# Patient Record
Sex: FEMALE | Race: BLACK OR AFRICAN AMERICAN | Employment: FULL TIME | URBAN - METROPOLITAN AREA
[De-identification: names, ages, dates, MRNs, and addresses within clinical notes are randomized per-mention and may not be internally consistent; named-entity substitution may affect disease eponyms.]

---

## 2017-01-18 ENCOUNTER — HOSPITAL ENCOUNTER (OUTPATIENT)
Dept: MAMMOGRAPHY | Age: 57
Discharge: HOME | End: 2017-01-18
Attending: OBSTETRICS & GYNECOLOGY
Payer: COMMERCIAL

## 2017-01-18 DIAGNOSIS — Z01.419 ENCOUNTER FOR GYNECOLOGICAL EXAMINATION WITHOUT ABNORMAL FINDING: ICD-10-CM

## 2017-01-18 PROCEDURE — 77067 SCR MAMMO BI INCL CAD: CPT

## 2017-03-13 ENCOUNTER — HOSPITAL ENCOUNTER (OUTPATIENT)
Dept: MAMMOGRAPHY | Age: 57
Discharge: HOME | End: 2017-03-13
Attending: INTERNAL MEDICINE
Payer: COMMERCIAL

## 2017-03-13 DIAGNOSIS — R92.8 ABNORMAL MAMMOGRAM OF LEFT BREAST: ICD-10-CM

## 2017-03-13 PROCEDURE — 77065 DX MAMMO INCL CAD UNI: CPT

## 2017-12-13 ENCOUNTER — OFFICE VISIT (OUTPATIENT)
Dept: SURGERY | Age: 57
End: 2017-12-13

## 2017-12-13 VITALS
WEIGHT: 185.2 LBS | HEART RATE: 84 BPM | SYSTOLIC BLOOD PRESSURE: 151 MMHG | HEIGHT: 61 IN | DIASTOLIC BLOOD PRESSURE: 81 MMHG | BODY MASS INDEX: 34.97 KG/M2 | TEMPERATURE: 97.2 F | OXYGEN SATURATION: 97 %

## 2017-12-13 DIAGNOSIS — Z90.710 S/P TAH (TOTAL ABDOMINAL HYSTERECTOMY): ICD-10-CM

## 2017-12-13 DIAGNOSIS — R23.2 HOT FLASHES: ICD-10-CM

## 2017-12-13 DIAGNOSIS — Z90.721 HISTORY OF LEFT SALPINGO-OOPHORECTOMY: ICD-10-CM

## 2017-12-13 DIAGNOSIS — Z01.419 WOMEN'S ANNUAL ROUTINE GYNECOLOGICAL EXAMINATION: Primary | ICD-10-CM

## 2017-12-13 DIAGNOSIS — Z90.79 HISTORY OF LEFT SALPINGO-OOPHORECTOMY: ICD-10-CM

## 2017-12-13 NOTE — MR AVS SNAPSHOT
Visit Information Date & Time Provider Department Dept. Phone Encounter #  
 12/13/2017 10:45 AM Fifi Abraham, [address] [facility] [address] [id number] Follow-up Instructions Return in about 1 year (around 12/13/2018), or if symptoms worsen or fail to improve. Upcoming Health Maintenance Date Due Hepatitis C Screening 1960 HEMOGLOBIN A1C Q6M 1960 LIPID PANEL Q1 1960 FOOT EXAM Q1 7/29/1970 MICROALBUMIN Q1 7/29/1970 EYE EXAM RETINAL OR DILATED Q1 7/29/1970 Pneumococcal 19-64 Medium Risk (1 of 1 - PPSV23) 7/29/1979 DTaP/Tdap/Td series (1 - Tdap) 7/29/1981 FOBT Q 1 YEAR AGE 50-75 7/29/2010 Influenza Age 5 to Adult 8/1/2017 PAP AKA CERVICAL CYTOLOGY 11/25/2018 Allergies as of 12/13/2017  Review Complete On: 12/13/2017 By: Fifi Abraham MD  
 No Known Allergies Current Immunizations  Never Reviewed No immunizations on file. Not reviewed this visit You Were Diagnosed With   
  
 Codes Comments Women's annual routine gynecological examination    -  Primary ICD-10-CM: F16.923 ICD-9-CM: V72.31 S/P MIRIAM (total abdominal hysterectomy)     ICD-10-CM: Z90.710 ICD-9-CM: V88.01 History of left salpingo-oophorectomy     ICD-10-CM: Z90.721, Z90.79 ICD-9-CM: V45.89 Hot flashes     ICD-10-CM: R23.2 ICD-9-CM: 782.62 Vitals BP Pulse Temp Height(growth percentile) Weight(growth percentile) LMP  
 151/81 84 97.2 °F (36.2 °C) (Oral) 5' 1\" (1.549 m) 185 lb 3.2 oz (84 kg) 08/01/2007 SpO2 BMI OB Status Smoking Status 97% 34.99 kg/m2 Hysterectomy Never Smoker Vitals History BMI and BSA Data Body Mass Index Body Surface Area 34.99 kg/m 2 1.9 m 2 Preferred Pharmacy Pharmacy Name Phone [address] [phone] Your Updated Medication List  
  
   
 This list is accurate as of: 12/13/17 11:24 AM.  Always use your most recent med list. amLODIPine 5 mg tablet Commonly known as:  Fernandez Presser Take 5 mg by mouth daily. atorvastatin 20 mg tablet Commonly known as:  LIPITOR Take 10 mg by mouth daily. FARXIGA 10 mg Tab tablet Generic drug:  dapagliflozin Take  by mouth.  
  
 glimepiride 4 mg tablet Commonly known as:  AMARYL Take 4 mg by mouth two (2) times a day. JANUMET 50-1,000 mg per tablet Generic drug:  SITagliptin-metFORMIN Take 1 Tab by mouth two (2) times daily (with meals). lisinopril 10 mg tablet Commonly known as:  Larry Pares Take  by mouth daily. TOUJEO SOLOSTAR SC  
by SubCUTAneous route. VITAMIN D2 50,000 unit capsule Generic drug:  ergocalciferol Take 50,000 Units by mouth two (2) times a week. Follow-up Instructions Return in about 1 year (around 12/13/2018), or if symptoms worsen or fail to improve. To-Do List   
 01/18/2018 Imaging:  DANIELA MAMMO BI SCREENING INCL CAD Introducing [facility]! Dario Bonilla introduces [email or web address] patient portal. Now you can access parts of your medical record, email your doctor's office, and request medication refills online. 1. In your internet browser, go to [email or web address] 2. Click on the First Time User? Click Here link in the Sign In box. You will see the New Member Sign Up page. 3. Enter your [email or web address] Access Code exactly as it appears below. You will not need to use this code after youve completed the sign-up process. If you do not sign up before the expiration date, you must request a new code. · [email or web address] Access Code: [email or web address] Expires: 3/13/2018 11:00 AM 
 
4. Enter the last four digits of your Social Security Number (xxxx) and Date of Birth (mm/dd/yyyy) as indicated and click Submit. You will be taken to the next sign-up page. 5. Create a OP3Nvoice ID. This will be your OP3Nvoice login ID and cannot be changed, so think of one that is secure and easy to remember. 6. Create a OP3Nvoice password. You can change your password at any time. 7. Enter your Password Reset Question and Answer. This can be used at a later time if you forget your password. 8. Enter your e-mail address. You will receive e-mail notification when new information is available in [address] 9. Click Sign Up. You can now view and download portions of your medical record. 10. Click the Download Summary menu link to download a portable copy of your medical information. If you have questions, please visit the Frequently Asked Questions section of the OP3Nvoice website. Remember, OP3Nvoice is NOT to be used for urgent needs. For medical emergencies, dial 911. Now available from your iPhone and Android! Please provide this summary of care documentation to your next provider. Your primary care clinician is listed as Trice Mckinney. If you have any questions after today's visit, please call [phone].

## 2017-12-13 NOTE — PROGRESS NOTES
SUBJECTIVE: Helder Montgomery is a 62 y.o. female [id number] (Abortions 1, Miscarriages 0, Ectopic 1), who presents with desire for annual well woman exam. Patient's last menstrual period was 2007. Severe hot flashes and no desire to restart ERT. Reports vaginal odor off and on when hot flashes are stronger and she perspires more. Using OTC supps with good results. No Known Allergies     Past Medical History:   Diagnosis Date    Diabetes ([address]     on insulin and 2 different pills. 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     Past Surgical History:   Procedure Laterality Date    HX HYSTERECTOMY  2007    MIRIAM LSO    HX PELVIC LAPAROSCOPY  3/20/08    for pelvic pain and findings were negative     OB History      Para Term  AB Living    4 2   2     SAB TAB Ectopic Molar Multiple Live Births      1           Family History   Problem Relation Age of Onset    Cancer Father      throat cancer    Diabetes Father     Diabetes Sister      diabetic coma     Social History     Social History    Marital status: SINGLE     Spouse name: N/A    Number of children: N/A    Years of education: N/A     Occupational History    Not on file. Social History Main Topics    Smoking status: Never Smoker    Smokeless tobacco: Never Used    Alcohol use 0.0 oz/week     0 Standard drinks or equivalent per week      Comment: occasionally    Drug use: No    Sexual activity: Yes     Partners: Male     Birth control/ protection: Surgical     Other Topics Concern    Not on file     Social History Narrative     Current Outpatient Prescriptions   Medication Sig Dispense Refill    sitaGLIPtin-metFORMIN (JANUMET) 50-1,000 mg per tablet Take 1 Tab by mouth two (2) times daily (with meals).  dapagliflozin (FARXIGA) 10 mg tab Take  by mouth.       amLODIPine (NORVASC) 5 mg tablet Take 5 mg by mouth daily.  glimepiride (AMARYL) 4 mg tablet Take 4 mg by mouth two (2) times a day.  lisinopril (PRINIVIL, ZESTRIL) 10 mg tablet Take  by mouth daily.  atorvastatin (LIPITOR) 20 mg tablet Take 10 mg by mouth daily.  ergocalciferol (VITAMIN D2) 50,000 unit capsule Take 50,000 Units by mouth two (2) times a week.  INSULIN GLARGINE,HUM. REC. ANLOG (TOUJEO SOLOSTAR SC) by SubCUTAneous route. Review of Systems:   Constitutional: No weight change, chills or fever, anorexia, weakness or sleep disturbance . Cardiovascular: No chest pain, shortness of breath, or palpitations . Respiratory: No cough, shortness of breath, hemoptysis, or orthopnea . Neurologic: No syncope, headaches or seizures . Hematologic: No easy bruising or unusual bleeding . Psychiatric: No insomnia, confusion, depression, or anxiety . GI:No nausea and vomiting, diarrhea or constipation  . : See HPI . Musculoskeletal: No joint pain or muscle pain . Endocrine: No polydipsia, polyuria, cold intolerance, excessive fatigue, or sleep disturbance . Integumentary: No breast pain, lumps, nipple discharge, or axillary lumps . Objective:     Visit Vitals    /81    Pulse 84    Temp 97.2 °F (36.2 °C) (Oral)    Ht 5' 1\" (1.549 m)    Wt 185 lb 3.2 oz (84 kg)    LMP 08/01/2007    SpO2 97%    BMI 34.99 kg/m2       General:  alert, cooperative, no distress, appears stated age   Skin:  no rash or abnormalities   Eyes: negative   Mouth: MMM no lesions   Lymph Nodes:  Cervical, supraclavicular, and axillary nodes normal.   Breast Exam: normal appearance, no masses or tenderness    Lungs:  clear to auscultation bilaterally   Heart:  regular rate and rhythm   Abdomen: soft, non-tender.  Bowel sounds normal. No masses,  no organomegaly   Back:  Costovertebral angle tenderness absent   Genitourinary: Pelvic exam: VULVA: normal appearing vulva with no masses, tenderness or lesions, VAGINA: normal appearing vagina with normal color and discharge, no lesions, CERVIX: surgically absent, UTERUS: surgically absent, vaginal cuff well healed, ADNEXA: surgically absent left    Extremities:  extremities normal, atraumatic, no cyanosis or edema   Neurologic:  sensation grossly intact. Psychiatric:  non focal     ASSESSMENT:      ICD-10-CM ICD-9-CM    1. Women's annual routine gynecological examination Z01.419 V72.31 DANIELA MAMMO BI SCREENING INCL CAD   2. S/P MIRIAM (total abdominal hysterectomy) Z90.710 V88.01    3. History of left salpingo-oophorectomy Z90.721 V45.89     Z90.79     4. Severe hot flashes R23.2 782.62         Follow-up Disposition:  Return in about 1 year (around 12/13/2018), or if symptoms worsen or fail to improve.

## 2018-08-29 ENCOUNTER — OP HISTORICAL/CONVERTED ENCOUNTER (OUTPATIENT)
Dept: OTHER | Age: 58
End: 2018-08-29

## 2018-12-19 ENCOUNTER — OFFICE VISIT (OUTPATIENT)
Dept: SURGERY | Age: 58
End: 2018-12-19

## 2018-12-19 VITALS
TEMPERATURE: 97.5 F | BODY MASS INDEX: 34.48 KG/M2 | DIASTOLIC BLOOD PRESSURE: 74 MMHG | RESPIRATION RATE: 18 BRPM | HEART RATE: 89 BPM | OXYGEN SATURATION: 100 % | WEIGHT: 182.6 LBS | SYSTOLIC BLOOD PRESSURE: 147 MMHG | HEIGHT: 61 IN

## 2018-12-19 DIAGNOSIS — N32.81 OVERACTIVE BLADDER: ICD-10-CM

## 2018-12-19 DIAGNOSIS — Z01.419 ENCOUNTER FOR GYNECOLOGICAL EXAMINATION WITHOUT ABNORMAL FINDING: Primary | ICD-10-CM

## 2018-12-19 DIAGNOSIS — Z12.31 VISIT FOR SCREENING MAMMOGRAM: ICD-10-CM

## 2018-12-19 NOTE — PROGRESS NOTES
Chief Complaint   Patient presents with    Well Woman     Pt presents in office for annual exam with no GYN c/o. Last pap 2015. Last mammogram 2016. PHQ over the last two weeks 12/19/2018   Little interest or pleasure in doing things Not at all   Feeling down, depressed, irritable, or hopeless Not at all   Total Score PHQ 2 0     1. Have you been to the ER, urgent care clinic since your last visit? Hospitalized since your last visit? No    2. Have you seen or consulted any other health care providers outside of the [facility], [place] since your last visit? Include any pap smears or colon screening.  No

## 2018-12-19 NOTE — PATIENT INSTRUCTIONS
Bladder Training: Care Instructions  Your Care Instructions    Bladder training is used to treat urge incontinence and stress incontinence. Urge incontinence means that the need to urinate comes on so fast that you can't get to a toilet in time. Stress incontinence means that you leak urine because of pressure on your bladder. For example, it may happen when you laugh, cough, or lift something heavy. Bladder training can increase how long you can wait before you have to urinate. It can also help your bladder hold more urine. And it can give you better control over the urge to urinate. It is important to remember that bladder training takes a few weeks to a few months to make a difference. You may not see results right away, but don't give up. Follow-up care is a key part of your treatment and safety. Be sure to make and go to all appointments, and call your doctor if you are having problems. It's also a good idea to know your test results and keep a list of the medicines you take. How can you care for yourself at home? Work with your doctor to come up with a bladder training program that is right for you. You may use one or more of the following methods. Delayed urination  · In the beginning, try to keep from urinating for 5 minutes after you first feel the need to go. · While you wait, take deep, slow breaths to relax. Kegel exercises can also help you delay the need to go to the bathroom. · After some practice, when you can easily wait 5 minutes to urinate, try to wait 10 minutes before you urinate. · Slowly increase the waiting period until you are able to control when you have to urinate. Scheduled urination  · Empty your bladder when you first wake up in the morning. · Schedule times throughout the day when you will urinate. · Start by going to the bathroom every hour, even if you don't need to go. · Slowly increase the time between trips to the bathroom.   · When you have found a schedule that works well for you, keep doing it. · If you wake up during the night and have to urinate, do it. Apply your schedule to waking hours only. Kegel exercises  These tighten and strengthen pelvic muscles, which can help you control the flow of urine. To do Kegel exercises:  · Squeeze the same muscles you would use to stop your urine. Your belly and thighs should not move. · Hold the squeeze for 3 seconds, and then relax for 3 seconds. · Start with 3 seconds. Then add 1 second each week until you are able to squeeze for 10 seconds. · Repeat the exercise 10 to 15 times a session. Do three or more sessions a day. When should you call for help? Watch closely for changes in your health, and be sure to contact your doctor if:    · Your incontinence is getting worse.     · You do not get better as expected. Where can you learn more? Go to http://cece-nitesh.info/. Enter F639 in the search box to learn more about \"Bladder Training: Care Instructions. \"  Current as of: March 21, 2018  Content Version: 11.8  © 5836-3039 [employer]. Care instructions adapted under license by [employer] (which disclaims liability or warranty for this information). If you have questions about a medical condition or this instruction, always ask your healthcare professional. [address] any warranty or liability for your use of this information. Kegel Exercises: Care Instructions  Your Care Instructions    Kegel exercises strengthen muscles around the bladder. These muscles control the flow of urine. Kegel exercises are sometime called \"pelvic floor\" exercises. They can help prevent urine leakage and keep the pelvic organs in place. A woman who just had a baby might want to try Kegel exercises. They can strengthen pelvic muscles that have been weakened by pregnancy and childbirth. A man or woman may use Kegel exercises to treat urine leakage.   You do Kegel exercises by tightening the muscles you use when you urinate. You will likely need to do these exercises for several weeks to get better. Follow-up care is a key part of your treatment and safety. Be sure to make and go to all appointments, and call your doctor if you are having problems. It's also a good idea to know your test results and keep a list of the medicines you take. How can you care for yourself at home? · Do Kegel exercises. ? Find the muscles you need to strengthen. To do this, tighten the muscles that stop your urine while you are going to the bathroom. These are the same muscles you squeeze during Kegel exercises. ? Squeeze the muscles as hard as you can. Your belly and thighs should not move. ? Hold the squeeze for 3 seconds. Then relax for 3 seconds. ? Start with 3 seconds, and then add 1 second each week until you are able to squeeze for 10 seconds. ? Repeat the exercise 10 to 15 times for each session. Do three or more sessions each day. · You can check to see if you are using the right muscles. Place a finger in your vagina and squeeze around it. You are doing them right when you feel pressure around your finger. Your doctor may also suggest that you put special weights in your vagina while you do the exercises. · Do not smoke. It can irritate the bladder. If you need help quitting, talk to your doctor about stop-smoking programs and medicines. These can increase your chances of quitting for good. Where can you learn more? Go to http://cece-nitesh.info/. Enter W371 in the search box to learn more about \"Kegel Exercises: Care Instructions. \"  Current as of: March 21, 2018  Content Version: 11.8  © 0305-2009 [employer]. Care instructions adapted under license by [employer] (which disclaims liability or warranty for this information).  If you have questions about a medical condition or this instruction, always ask your healthcare professional. [address] any warranty or liability for your use of this information. Well Visit, Women 48 to 72: Care Instructions  Your Care Instructions    Physical exams can help you stay healthy. Your doctor has checked your overall health and may have suggested ways to take good care of yourself. He or she also may have recommended tests. At home, you can help prevent illness with healthy eating, regular exercise, and other steps. Follow-up care is a key part of your treatment and safety. Be sure to make and go to all appointments, and call your doctor if you are having problems. It's also a good idea to know your test results and keep a list of the medicines you take. How can you care for yourself at home? · Reach and stay at a healthy weight. This will lower your risk for many problems, such as obesity, diabetes, heart disease, and high blood pressure. · Get at least 30 minutes of exercise on most days of the week. Walking is a good choice. You also may want to do other activities, such as running, swimming, cycling, or playing tennis or team sports. · Do not smoke. Smoking can make health problems worse. If you need help quitting, talk to your doctor about stop-smoking programs and medicines. These can increase your chances of quitting for good. · Protect your skin from too much sun. When you're outdoors from 10 a.m. to 4 p.m., stay in the shade or cover up with clothing and a hat with a wide brim. Wear sunglasses that block UV rays. Even when it's cloudy, put broad-spectrum sunscreen (SPF 30 or higher) on any exposed skin. · See a dentist one or two times a year for checkups and to have your teeth cleaned. · Wear a seat belt in the car. · Limit alcohol to 1 drink a day. Too much alcohol can cause health problems. Follow your doctor's advice about when to have certain tests. These tests can spot problems early. · Cholesterol.  Your doctor will tell you how often to have this done based on your age, family history, or other things that can increase your risk for heart attack and stroke. · Blood pressure. Have your blood pressure checked during a routine doctor visit. Your doctor will tell you how often to check your blood pressure based on your age, your blood pressure results, and other factors. · Mammogram. Ask your doctor how often you should have a mammogram, which is an X-ray of your breasts. A mammogram can spot breast cancer before it can be felt and when it is easiest to treat. · Pap test and pelvic exam. Ask your doctor how often you should have a Pap test. You may not need to have a Pap test as often as you used to. · Vision. Have your eyes checked every year or two or as often as your doctor suggests. Some experts recommend that you have yearly exams for glaucoma and other age-related eye problems starting at age 48. · Hearing. Tell your doctor if you notice any change in your hearing. You can have tests to find out how well you hear. · Diabetes. Ask your doctor whether you should have tests for diabetes. · Colon cancer. You should begin tests for colon cancer at age 48. You may have one of several tests. Your doctor will tell you how often to have tests based on your age and risk. Risks include whether you already had a precancerous polyp removed from your colon or whether your parents, sisters and brothers, or children have had colon cancer. · Thyroid disease. Talk to your doctor about whether to have your thyroid checked as part of a regular physical exam. Women have an increased chance of a thyroid problem. · Osteoporosis. You should begin tests for bone density at age 72. If you are younger than 72, ask your doctor whether you have factors that may increase your risk for this disease. You may want to have this test before age 72. · Heart attack and stroke risk. At least every 4 to 6 years, you should have your risk for heart attack and stroke assessed. Your doctor uses factors such as your age, blood pressure, cholesterol, and whether you smoke or have diabetes to show what your risk for a heart attack or stroke is over the next 10 years. When should you call for help? Watch closely for changes in your health, and be sure to contact your doctor if you have any problems or symptoms that concern you. Where can you learn more? Go to http://cece-nitesh.info/. Enter A709 in the search box to learn more about \"Well Visit, Women 50 to 72: Care Instructions. \"  Current as of: March 29, 2018  Content Version: 11.8  © 3891-7431 [employer], [employer]. Care instructions adapted under license by [employer] (which disclaims liability or warranty for this information). If you have questions about a medical condition or this instruction, always ask your healthcare professional. [address] any warranty or liability for your use of this information.

## 2018-12-19 NOTE — PROGRESS NOTES
Annual exam ages 40-58 post hysterectomy    Jennifer Ryan is a ,  62 y.o. female [address] Patient's last menstrual period was 2007. .    She presents for her annual checkup. She is having no significant problems. Patient does have some leakage. Patient with some urgency symptoms. No leakage with coughing or sneezing. Does not happen all the time. With regard to the Gardasil vaccine, she is older than the age for which it is FDA approved. Hormonal status:  S/p MIRIAM/BSO for pelvic pain   She is not having vasomotor symptoms. Has occasional hot flashes that are not bothersome. The patient is not using any ERT. Sexual history:    She  reports that she currently engages in sexual activity and has had partners who are Male. She reports using the following method of birth control/protection: Surgical.    Medical conditions:    Since her last annual GYN exam about one year ago, she has not the following changes in her health history: none. Surgical history confirmed with patient. has a past surgical history that includes hx hysterectomy (2007) and hx pelvic laparoscopy (3/20/08). Pap and Mammogram History:    S/p hysterectomy. No hx of abnormal paps    The patient with mammogram last year. Breast Cancer History/Substance Abuse: negative    Osteoporosis History:    Family history does not include a first or second degree relative with osteopenia or osteoporosis. She is currently taking calcium and vit D. Past Medical History:   Diagnosis Date    Diabetes ([address]     on insulin and 2 different pills.     Hypercholesterolemia     on meds    Hypertension     on meds     Ill-defined disease     lumbosacral pain d/t degenerative change in lumbar spine     Past Surgical History:   Procedure Laterality Date    HX HYSTERECTOMY  2007    MIRIAM LSO    HX PELVIC LAPAROSCOPY  3/20/08    for pelvic pain and findings were negative       Current Outpatient Medications Medication Sig Dispense Refill    dapagliflozin (FARXIGA) 10 mg tab Take  by mouth.  glimepiride (AMARYL) 4 mg tablet Take 4 mg by mouth two (2) times a day.  lisinopril (PRINIVIL, ZESTRIL) 10 mg tablet Take  by mouth daily.  atorvastatin (LIPITOR) 20 mg tablet Take 10 mg by mouth daily.  ergocalciferol (VITAMIN D2) 50,000 unit capsule Take 50,000 Units by mouth two (2) times a week.  INSULIN GLARGINE,HUM. REC. ANLOG (TOUJEO SOLOSTAR SC) by SubCUTAneous route.  sitaGLIPtin-metFORMIN (JANUMET) 50-1,000 mg per tablet Take 1 Tab by mouth two (2) times daily (with meals).  amLODIPine (NORVASC) 5 mg tablet Take 5 mg by mouth daily. Allergies: Patient has no known allergies. Tobacco History:  reports that  has never smoked. she has never used smokeless tobacco.  Alcohol Abuse:  reports that she drinks alcohol. Occasional EtOH. Drug Abuse:  reports that she does not use drugs. Patient feels safe at home.     Family Medical/Cancer History:   Family History   Problem Relation Age of Onset    Cancer Father         throat cancer    Diabetes Father     Diabetes Sister         diabetic coma        Review of Systems - History obtained from the patient  Constitutional: negative for weight loss, fever, night sweats  HEENT: negative for hearing loss, earache, congestion, snoring, sorethroat  CV: negative for chest pain, palpitations, edema  Resp: negative for cough, shortness of breath, wheezing  GI: negative for change in bowel habits, abdominal pain, black or bloody stools  : negative for frequency, dysuria, hematuria, vaginal discharge  MSK: negative for back pain, joint pain, muscle pain  Breast: negative for breast lumps, nipple discharge, galactorrhea  Skin :negative for itching, rash, hives  Neuro: negative for dizziness, headache, confusion, weakness  Psych: negative for anxiety, depression, change in mood  Heme/lymph: negative for bleeding, bruising, pallor    Physical Exam    Visit Vitals  /74 (BP 1 Location: Right arm, BP Patient Position: Sitting)   Pulse 89   Temp 97.5 °F (36.4 °C) (Oral)   Resp 18   Ht 5' 1\" (1.549 m)   Wt 182 lb 9.6 oz (82.8 kg)   LMP 08/01/2007   SpO2 100%   BMI 34.50 kg/m²     Constitutional  · Appearance: well-nourished, well developed, alert, in no acute distress    HENT  · Head and Face: appears normal    Neck  · Inspection/Palpation: normal appearance, no masses or tenderness  · Lymph Nodes: no lymphadenopathy present  · Thyroid: gland size normal, nontender, no nodules or masses present on palpation    Chest  · Respiratory Effort: breathing unlabored  · Auscultation: normal breath sounds    Cardiovascular  · Heart:  · Auscultation: regular rate and rhythm without murmur    Breasts  · Inspection of Breasts: breasts symmetrical, no skin changes, no discharge present, nipple appearance normal, no skin retraction present  · Palpation of Breasts and Axillae: no masses present on palpation, no breast tenderness  · Axillary Lymph Nodes: no lymphadenopathy present    Gastrointestinal  · Abdominal Examination: abdomen non-tender to palpation, normal bowel sounds, no masses present  · Liver and spleen: no hepatomegaly present, spleen not palpable  · Hernias: no hernias identified    Genitourinary  · External Genitalia: normal appearance for age, no discharge present, no tenderness present, no inflammatory lesions present, no masses present, no atrophy present  · Vagina: normal vaginal vault without central or paravaginal defects, no discharge present, no inflammatory lesions present, no masses present  · Bladder: non-tender to palpation  · Urethra: appears normal  · Cervix: absent  · Uterus: absent  · Adnexa: no adnexal tenderness present, no adnexal masses present  · Perineum: perineum within normal limits, no evidence of trauma, no rashes or skin lesions present  · Anus: anus within normal limits, no hemorrhoids present  · Inguinal Lymph Nodes: no lymphadenopathy present    Skin  · General Inspection: no rash, no lesions identified    Neurologic/Psychiatric  · Mental Status:  · Orientation: grossly oriented to person, place and time  · Mood and Affect: mood normal, affect appropriate    Assessment:  Routine gynecologic examination  Her current medical status is satisfactory with no evidence of significant gynecologic issues. Plan:  - pap smear not indicated  - mammogram  - discussed options for overactive bladder. Gave info on kegel's and bladder training.   Patient would like to try conservative measures first      Counseled re: diet, exercise, healthy lifestyle  Return for yearly wellness visits  Rec annual mammogram

## 2019-01-09 ENCOUNTER — HOSPITAL ENCOUNTER (OUTPATIENT)
Dept: MAMMOGRAPHY | Age: 59
Discharge: HOME | End: 2019-01-09
Attending: OBSTETRICS & GYNECOLOGY
Payer: COMMERCIAL

## 2019-01-09 DIAGNOSIS — Z12.31 VISIT FOR SCREENING MAMMOGRAM: ICD-10-CM

## 2019-01-09 PROCEDURE — 77063 BREAST TOMOSYNTHESIS BI: CPT

## 2022-01-31 ENCOUNTER — OFFICE VISIT (OUTPATIENT)
Dept: ENDOCRINOLOGY | Age: 62
End: 2022-01-31
Payer: COMMERCIAL

## 2022-01-31 VITALS — WEIGHT: 176 LBS | BODY MASS INDEX: 33.23 KG/M2 | HEIGHT: 61 IN

## 2022-01-31 DIAGNOSIS — E78.2 MIXED HYPERLIPIDEMIA: ICD-10-CM

## 2022-01-31 DIAGNOSIS — Z79.4 TYPE 2 DIABETES MELLITUS WITH HYPERGLYCEMIA, WITH LONG-TERM CURRENT USE OF INSULIN (HCC): Primary | ICD-10-CM

## 2022-01-31 DIAGNOSIS — E11.65 TYPE 2 DIABETES MELLITUS WITH HYPERGLYCEMIA, WITH LONG-TERM CURRENT USE OF INSULIN (HCC): Primary | ICD-10-CM

## 2022-01-31 DIAGNOSIS — I10 PRIMARY HYPERTENSION: ICD-10-CM

## 2022-01-31 PROCEDURE — 99214 OFFICE O/P EST MOD 30 MIN: CPT | Performed by: INTERNAL MEDICINE

## 2022-01-31 RX ORDER — LISINOPRIL 20 MG/1
20 TABLET ORAL DAILY
COMMUNITY
Start: 2022-01-16

## 2022-01-31 RX ORDER — ATORVASTATIN CALCIUM 10 MG/1
10 TABLET, FILM COATED ORAL DAILY
COMMUNITY
End: 2022-11-03

## 2022-01-31 RX ORDER — FLASH GLUCOSE SCANNING READER
EACH MISCELLANEOUS
COMMUNITY
End: 2022-01-31 | Stop reason: ALTCHOICE

## 2022-01-31 RX ORDER — AMITRIPTYLINE HYDROCHLORIDE 25 MG/1
25 TABLET, FILM COATED ORAL
COMMUNITY

## 2022-01-31 RX ORDER — FLASH GLUCOSE SENSOR
KIT MISCELLANEOUS
Qty: 2 KIT | Refills: 12 | Status: SHIPPED | OUTPATIENT
Start: 2022-01-31 | End: 2022-11-01 | Stop reason: SDUPTHER

## 2022-01-31 RX ORDER — IBUPROFEN 600 MG/1
600 TABLET ORAL
COMMUNITY
Start: 2021-12-16 | End: 2022-06-07

## 2022-01-31 RX ORDER — GLIPIZIDE 5 MG/1
TABLET ORAL 2 TIMES DAILY
COMMUNITY
End: 2022-06-06 | Stop reason: SDUPTHER

## 2022-01-31 RX ORDER — INSULIN GLARGINE 300 U/ML
62 INJECTION, SOLUTION SUBCUTANEOUS
COMMUNITY
Start: 2021-12-27 | End: 2022-05-12 | Stop reason: SDUPTHER

## 2022-01-31 RX ORDER — PREDNISONE 10 MG/1
TABLET ORAL
COMMUNITY
End: 2022-05-02 | Stop reason: ALTCHOICE

## 2022-01-31 RX ORDER — FOLIC ACID 1 MG/1
1 TABLET ORAL DAILY
COMMUNITY
End: 2022-01-31 | Stop reason: ALTCHOICE

## 2022-01-31 RX ORDER — METHOTREXATE 2.5 MG/1
TABLET ORAL
COMMUNITY
Start: 2021-12-21 | End: 2022-01-31 | Stop reason: ALTCHOICE

## 2022-01-31 RX ORDER — PEN NEEDLE, DIABETIC 31 GX3/16"
NEEDLE, DISPOSABLE MISCELLANEOUS
COMMUNITY

## 2022-01-31 RX ORDER — SEMAGLUTIDE 1.34 MG/ML
INJECTION, SOLUTION SUBCUTANEOUS
COMMUNITY
Start: 2021-11-15 | End: 2022-02-22 | Stop reason: SDUPTHER

## 2022-01-31 NOTE — PROGRESS NOTES
Chief Complaint   Patient presents with    Diabetes       Patient was last seen: New Patient Visit - last seen by me at my old practice  8/21     General:   Ups and downs since last visit  Has been on prednisone -started in Sept 10mg, then down to 5mg in Dec after sugars in 200s  Has been the only thing that has worked for Rheumatoid Arthritis (off [address] now)    A1c: last a1c was 8.6     DM Medications:    Toujeo max 50u AM  Glipizide 5mg 2 BID AC (can miss 2nd dose)  farxiga 10mg every day (out for awhile - just got new Rx from PCP)  ozempic 1mg/wk (was janumet switched early '21- too expensive)    Last Changes: no recent changes    Sugar Checks: checks more than 4 times a day and uses [employer] CGM -has been out    AM: does not check sugars  Ran out of sensors  -was in 200s before prednisone reduced    PM: does not check sugars Ran out of sensors    LOWs:  denies low sugars     DIET: is working on it, feels does well with diabetic diet     EXERCISE: does not exercise     HTN: at goal, on ACE-I     LIPIDS: at goal, on statin, lipids followed by PCP    RENAL: has normal renal function, has normal SUHA-r , in the past year, is on an ACE-I     EYES: overdue for eye exam     FEET: has no current issues, no numbness or tingling     DENTAL:  has seen dentist in past year     HEART:  no chest pain, shortness of breath or claudication, has no cardiac history     ASA:  does not take aspirin or other blood thinner     SYMPTOMS: no polyuria, thirst or blurred vision     THYROID: no known thyroid issue    DIABETES HISTORY:   From initial visit at [facility]:  Diagnosed '07  Started off on metformin and glyburide  was on avandia but stopped b/c heart risks (no wt gain or swelling)  Insulin started 6m -1 yr ago  Was on januvia in past - she just stopped taking (b/c heard could cause cancer0  No starlix or prandin  Only 70/30 for insulin type  No GLP1  Working out in past did not help sugars      LABS/STUDIES:  7/30/21 a1c 8.9, TSH 1.5  10/24/20 chol 184/133/64/97, SUHA-r 28, a1c 9.8  5/5/21 a1c 8.3  8/23/21 a1c 8.6, GFR > 60, Chol 170/201/52/84  11/21/21 GFR > 60, TSH 0.4, Vit D 34 (other labs too)    Past Medical History:   Diagnosis Date    Hypercholesterolemia     on meds    Hyperlipidemia     Hypertension     on meds     Ill-defined disease     lumbosacral pain d/t degenerative change in lumbar spine    Type 2 diabetes mellitus ([facility] [address]         Past Surgical History:   Procedure Laterality Date    HX HYSTERECTOMY  8/2007    MIRIAM LSO    HX PELVIC LAPAROSCOPY  3/20/08    for pelvic pain and findings were negative        Social History     Tobacco Use    Smoking status: Never Smoker    Smokeless tobacco: Never Used   Substance Use Topics    Alcohol use: Yes     Alcohol/week: 0.0 standard drinks     Comment: occasionally      Employer:  [employer]     Family History   Problem Relation Age of Onset    Cancer Father         throat cancer    Diabetes Father     Diabetes Sister         diabetic coma    Breast Cancer Paternal Aunt         Paternal aunt in her 63's when diagnosed        Height 5' 1\" (1.549 m), weight 176 lb (79.8 kg), last menstrual period 08/01/2007. Weight Metrics 1/31/2022 12/19/2018 12/13/2017 11/30/2016 11/25/2015   Weight 176 lb 182 lb 9.6 oz 185 lb 3.2 oz 180 lb 9.6 oz 177 lb 12.8 oz   BMI 33.25 kg/m2 34.5 kg/m2 34.99 kg/m2 34.12 kg/m2 33.61 kg/m2        EXAM  - GENERAL: NCAT, Appears well nourished   - EYES: EOMI, non-icteric, no proptosis   - Ear/Nose/Throat: NCAT, no visible inflammation or masses   - CARDIOVASCULAR: no cyanosis, no visible JVD   - RESPIRATORY: respiratory effort normal without any distress or labored breathing   - MUSCULOSKELETAL: Normal ROM of neck and upper extremities observed   - SKIN: No rash on face  - NEUROLOGIC:  No facial asymmetry (Cranial nerve 7 motor function), No gaze palsy   - PSYCHIATRIC: Normal affect, Normal insight and judgement      Assessment/Plan:   1.  Type 2 diabetes mellitus with hyperglycemia, with long-term current use of insulin (HCC)  On steroids now for Rheumatoid Arthritis  - sugars were high  Dose was decreased from 10 to 5mg of prednisone in last Dec  Was also out of farxiga- just restarted  Out of sensors - so no sugar readings since lowered prednisone and back on farxiga  Will upgrade to Buzz[employer]- use the phone jayesh as reader  Report in 2 weeks how sugars are doing so can make adjustments  If overall high, can up insulin; if more meal related, can add back metformin (stopped when switched janumet to ozempic due to cost)  Refer to [facility] for more education    2. Primary hypertension  At goal on ACE-I    3. Mixed hyperlipidemia  At goal on statin       Orders Placed This Encounter    HEMOGLOBIN A1C WITH EAG    METABOLIC PANEL, BASIC    LIPID PANEL    MICROALBUMIN, UR, RAND W/ MICROALB/CREAT RATIO    REFERRAL TO DIABETIC EDUCATION     Referral Priority:   Routine     Referral Type:   Consultation     Referral Reason:   Specialty Services Required     Number of Visits Requested:   1    flash glucose sensor (FreeStyle Paresh 2 Sensor) kit     Sig: Use as directed     Dispense:  2 Kit     Refill:  12      Follow-up and Dispositions    · Return in about 3 months (around 4/30/2022).

## 2022-02-01 LAB
ALBUMIN/CREAT UR: 20 MG/G CREAT (ref 0–29)
BUN SERPL-MCNC: 17 MG/DL (ref 8–27)
BUN/CREAT SERPL: 37 (ref 12–28)
CALCIUM SERPL-MCNC: 9.1 MG/DL (ref 8.7–10.3)
CHLORIDE SERPL-SCNC: 105 MMOL/L (ref 96–106)
CHOLEST SERPL-MCNC: 182 MG/DL (ref 100–199)
CO2 SERPL-SCNC: 23 MMOL/L (ref 20–29)
CREAT SERPL-MCNC: 0.46 MG/DL (ref 0.57–1)
CREAT UR-MCNC: 23.4 MG/DL
EST. AVERAGE GLUCOSE BLD GHB EST-MCNC: 266 MG/DL
GLUCOSE SERPL-MCNC: 225 MG/DL (ref 65–99)
HBA1C MFR BLD: 10.9 % (ref 4.8–5.6)
HDLC SERPL-MCNC: 71 MG/DL
LDLC SERPL CALC-MCNC: 94 MG/DL (ref 0–99)
MICROALBUMIN UR-MCNC: 4.7 UG/ML
POTASSIUM SERPL-SCNC: 4 MMOL/L (ref 3.5–5.2)
SODIUM SERPL-SCNC: 141 MMOL/L (ref 134–144)
TRIGL SERPL-MCNC: 95 MG/DL (ref 0–149)
VLDLC SERPL CALC-MCNC: 17 MG/DL (ref 5–40)

## 2022-02-03 ENCOUNTER — TRANSCRIBE ORDER (OUTPATIENT)
Dept: SCHEDULING | Age: 62
End: 2022-02-03

## 2022-02-03 DIAGNOSIS — Z12.31 VISIT FOR SCREENING MAMMOGRAM: Primary | ICD-10-CM

## 2022-02-08 ENCOUNTER — TELEPHONE (OUTPATIENT)
Dept: ENDOCRINOLOGY | Age: 62
End: 2022-02-08

## 2022-02-08 RX ORDER — FLASH GLUCOSE SCANNING READER
EACH MISCELLANEOUS
Qty: 1 EACH | Refills: 0 | Status: SHIPPED | OUTPATIENT
Start: 2022-02-08

## 2022-02-08 NOTE — TELEPHONE ENCOUNTER
Pharmacy called and LVM 2/8 @ 9:05am. Stated they need a new rx for a jeis reader to go along with the sensors. Pharmacy # [phone].

## 2022-02-08 NOTE — TELEPHONE ENCOUNTER
Pharmacy called and LVM 2/8 @ 9:05am. Stated they need a new rx for a jesi reader to go along with the sensors. Pharmacy # [phone].     I called the pharmacy and they said they needed it for insurance purposes  Tamara Kemp

## 2022-02-16 ENCOUNTER — HOSPITAL ENCOUNTER (OUTPATIENT)
Dept: MAMMOGRAPHY | Age: 62
Discharge: HOME | End: 2022-02-16
Attending: FAMILY MEDICINE
Payer: COMMERCIAL

## 2022-02-16 DIAGNOSIS — Z12.31 VISIT FOR SCREENING MAMMOGRAM: ICD-10-CM

## 2022-02-16 PROCEDURE — 77063 BREAST TOMOSYNTHESIS BI: CPT

## 2022-02-23 RX ORDER — SEMAGLUTIDE 1.34 MG/ML
1 INJECTION, SOLUTION SUBCUTANEOUS
Qty: 3 EACH | Refills: 3 | Status: SHIPPED | OUTPATIENT
Start: 2022-02-23 | End: 2022-06-02 | Stop reason: DRUGHIGH

## 2022-04-01 ENCOUNTER — TRANSCRIBE ORDER (OUTPATIENT)
Dept: SCHEDULING | Age: 62
End: 2022-04-01

## 2022-04-01 DIAGNOSIS — Z12.31 SCREENING MAMMOGRAM FOR HIGH-RISK PATIENT: Primary | ICD-10-CM

## 2022-04-13 ENCOUNTER — HOSPITAL ENCOUNTER (OUTPATIENT)
Dept: MAMMOGRAPHY | Age: 62
Discharge: HOME | End: 2022-04-13
Attending: FAMILY MEDICINE
Payer: COMMERCIAL

## 2022-04-13 DIAGNOSIS — R92.8 ABNORMAL MAMMOGRAM OF RIGHT BREAST: ICD-10-CM

## 2022-04-13 DIAGNOSIS — Z12.31 SCREENING MAMMOGRAM FOR HIGH-RISK PATIENT: ICD-10-CM

## 2022-04-13 PROCEDURE — 77065 DX MAMMO INCL CAD UNI: CPT

## 2022-04-15 NOTE — PROGRESS NOTES
Called Dr. Tia Hill office and spoke with Rosalia Abarca. Advised that a Right breast biopsy recommendation has been made by the radiologist. Asked to please fax order to [phone].

## 2022-04-21 ENCOUNTER — HOSPITAL ENCOUNTER (OUTPATIENT)
Dept: MAMMOGRAPHY | Age: 62
Discharge: HOME | End: 2022-04-21
Payer: COMMERCIAL

## 2022-04-21 DIAGNOSIS — R92.8 ABNORMAL MAMMOGRAM OF RIGHT BREAST: ICD-10-CM

## 2022-04-21 PROCEDURE — 77065 DX MAMMO INCL CAD UNI: CPT

## 2022-04-21 PROCEDURE — 88341 IMHCHEM/IMCYTCHM EA ADD ANTB: CPT

## 2022-04-21 PROCEDURE — 74011000250 HC RX REV CODE- 250: Performed by: RADIOLOGY

## 2022-04-21 PROCEDURE — 88305 TISSUE EXAM BY PATHOLOGIST: CPT

## 2022-04-21 PROCEDURE — 88360 TUMOR IMMUNOHISTOCHEM/MANUAL: CPT

## 2022-04-21 PROCEDURE — 19081 BX BREAST 1ST LESION STRTCTC: CPT

## 2022-04-21 PROCEDURE — 88342 IMHCHEM/IMCYTCHM 1ST ANTB: CPT

## 2022-04-21 RX ORDER — LIDOCAINE HYDROCHLORIDE AND EPINEPHRINE 10; 10 MG/ML; UG/ML
10 INJECTION, SOLUTION INFILTRATION; PERINEURAL ONCE
Status: COMPLETED | OUTPATIENT
Start: 2022-04-21 | End: 2022-04-21

## 2022-04-21 RX ORDER — LIDOCAINE HYDROCHLORIDE 10 MG/ML
15 INJECTION INFILTRATION; PERINEURAL
Status: COMPLETED | OUTPATIENT
Start: 2022-04-21 | End: 2022-04-21

## 2022-04-21 RX ADMIN — LIDOCAINE HYDROCHLORIDE,EPINEPHRINE BITARTRATE 100 MG: 10; .01 INJECTION, SOLUTION INFILTRATION; PERINEURAL at 10:50

## 2022-04-21 RX ADMIN — LIDOCAINE HYDROCHLORIDE 15 ML: 10 INJECTION, SOLUTION INFILTRATION; PERINEURAL at 10:50

## 2022-04-21 NOTE — PROGRESS NOTES
Patient tolerated right breast biopsy well with large amount of bleeding. Bleeding stopped with approximately 10 minutes of manual pressure and ice pack applied . Biopsy site was bandaged in the usual fashion and discharge instructions were reviewed with the patient. She was provided with a written copy as well. Advised patient that results should be available by Monday and that she will receive a phone call in the afternoon. Encouraged her to call with any questions or concerns.

## 2022-04-25 NOTE — PROGRESS NOTES
Pathology approved by Dr. Sander Lopez. Called patient and relayed benign results. Advised patient that because the of the atypical cells found, the recommendation is that she meet with a breast surgeon to discuss next steps. Appointment made with Dr. Nadeem Soriano 5/2 at 0930. Patient was provided with the appointment information. She reports that her biopsy site is healing well with no concerns. Encouraged her to call with any question or concerns.

## 2022-05-02 ENCOUNTER — OFFICE VISIT (OUTPATIENT)
Dept: ENDOCRINOLOGY | Age: 62
End: 2022-05-02

## 2022-05-02 ENCOUNTER — OFFICE VISIT (OUTPATIENT)
Dept: SURGERY | Age: 62
End: 2022-05-02
Payer: COMMERCIAL

## 2022-05-02 VITALS
BODY MASS INDEX: 32.66 KG/M2 | WEIGHT: 173 LBS | DIASTOLIC BLOOD PRESSURE: 80 MMHG | HEART RATE: 104 BPM | HEIGHT: 61 IN | SYSTOLIC BLOOD PRESSURE: 156 MMHG

## 2022-05-02 VITALS
DIASTOLIC BLOOD PRESSURE: 79 MMHG | HEART RATE: 100 BPM | BODY MASS INDEX: 31.93 KG/M2 | WEIGHT: 169 LBS | SYSTOLIC BLOOD PRESSURE: 154 MMHG

## 2022-05-02 DIAGNOSIS — Z79.4 TYPE 2 DIABETES MELLITUS WITH HYPERGLYCEMIA, WITH LONG-TERM CURRENT USE OF INSULIN (HCC): Primary | ICD-10-CM

## 2022-05-02 DIAGNOSIS — N60.99 ATYPICAL HYPERPLASIA OF BREAST: Primary | ICD-10-CM

## 2022-05-02 DIAGNOSIS — E11.65 TYPE 2 DIABETES MELLITUS WITH HYPERGLYCEMIA, WITH LONG-TERM CURRENT USE OF INSULIN (HCC): Primary | ICD-10-CM

## 2022-05-02 PROCEDURE — 76642 ULTRASOUND BREAST LIMITED: CPT | Performed by: SURGERY

## 2022-05-02 PROCEDURE — 99203 OFFICE O/P NEW LOW 30 MIN: CPT | Performed by: SURGERY

## 2022-05-02 PROCEDURE — 99214 OFFICE O/P EST MOD 30 MIN: CPT | Performed by: INTERNAL MEDICINE

## 2022-05-02 PROCEDURE — 3046F HEMOGLOBIN A1C LEVEL >9.0%: CPT | Performed by: INTERNAL MEDICINE

## 2022-05-02 RX ORDER — PREDNISONE 5 MG/1
2.5 TABLET ORAL DAILY
COMMUNITY
Start: 2022-04-13

## 2022-05-02 RX ORDER — LEFLUNOMIDE 20 MG/1
20 TABLET ORAL DAILY
COMMUNITY
Start: 2022-03-16

## 2022-05-02 NOTE — PROGRESS NOTES
HISTORY OF PRESENT ILLNESS  Clarisa Beyer is a 64 y.o. female. HPI NEW patient consult referred by  Agusto Nathan for RIGHT breast atypia. She had calcifications found on her mammogram. These were biopsied. STbx- 4/21/22- ATYPICAL DUCTAL HYPERPLASIA      Family History: maternal aunt had cancer not sure what type. Father had throat cancer. Mammogram, 4/13/22, BIRADS 4a :Right breast calcification    Past Medical History:   Diagnosis Date    Hypercholesterolemia     on meds    Hyperlipidemia     Hypertension     on meds     Ill-defined disease     lumbosacral pain d/t degenerative change in lumbar spine    Type 2 diabetes mellitus ([facility] [address]      Past Surgical History:   Procedure Laterality Date    HX HYSTERECTOMY  8/2007    MIRIAM LSO    HX PELVIC LAPAROSCOPY  3/20/08    for pelvic pain and findings were negative     Social History     Socioeconomic History    Marital status: SINGLE     Spouse name: Not on file    Number of children: Not on file    Years of education: Not on file    Highest education level: Not on file   Occupational History    Not on file   Tobacco Use    Smoking status: Never Smoker    Smokeless tobacco: Never Used   Substance and Sexual Activity    Alcohol use: Yes     Alcohol/week: 0.0 standard drinks     Comment: occasionally    Drug use: No    Sexual activity: Yes     Partners: Male     Birth control/protection: Surgical   Other Topics Concern    Not on file   Social History Narrative    Not on file     Social Determinants of Health     Financial Resource Strain:     Difficulty of Paying Living Expenses: Not on file   Food Insecurity:     Worried About Running Out of Food in the Last Year: Not on file    Lily of Food in the Last Year: Not on file   Transportation Needs:     Lack of Transportation (Medical): Not on file    Lack of Transportation (Non-Medical):  Not on file   Physical Activity:     Days of Exercise per Week: Not on file    Minutes of Exercise per Session: Not on file   Stress:     Feeling of Stress : Not on file   Social Connections:     Frequency of Communication with Friends and Family: Not on file    Frequency of Social Gatherings with Friends and Family: Not on file    Attends Gnosticism Services: Not on file    Active Member of [address] [place] [address] or Organizations: Not on file    Attends Club or Organization Meetings: Not on file    Marital Status: Not on file   Intimate Partner Violence:     Fear of Current or Ex-Partner: Not on file    Emotionally Abused: Not on file    Physically Abused: Not on file    Sexually Abused: Not on file   Housing Stability:     Unable to Pay for Housing in the Last Year: Not on file    Number of [place] in the Last Year: Not on file    Unstable Housing in the Last Year: Not on file     OB History        4    Para   2    Term                AB   2    Living           SAB        IAB        Ectopic   1    Molar        Multiple        Live Births              Obstetric Comments   Menarche 12, LMP n/a, # of children 2, age of 1st delivery 1982, Hysterectomy/oophorectomy yes/yes, Breast bx yes/right/22, history of breast feeding no, BCP no, Hormone therapy no             Current Outpatient Medications:     Ozempic 1 mg/dose (4 mg/3 mL) pnij, 1 mg by SubCUTAneous route every seven (7) days. , Disp: 3 Each, Rfl: 3    flash glucose scanning reader ([employer] Paresh 2 [place]) misc, Use as directed by MD, Disp: 1 Each, Rfl: 0    ibuprofen (MOTRIN) 600 mg tablet, Take 600 mg by mouth every six (6) hours as needed. , Disp: , Rfl:     Toujeo Max U-300 SoloStar 300 unit/mL (3 mL) inpn, INJECT SUBCUTANEOUS 50UNITS ONCE DAILY, Disp: , Rfl:     lisinopriL (PRINIVIL, ZESTRIL) 20 mg tablet, Take 20 mg by mouth daily. , Disp: , Rfl:     glipiZIDE (GLUCOTROL) 5 mg tablet, Take  by mouth two (2) times a day., Disp: , Rfl:     Insulin Needles, Disposable, (Idania Pen Needle) 32 gauge x \" ndle, by Does Not Apply route., Disp: , Rfl:     pen needle, diabetic (NOVOFINE 32), by Does Not Apply route., Disp: , Rfl:     atorvastatin (LIPITOR) 10 mg tablet, Take 10 mg by mouth daily. , Disp: , Rfl:     amitriptyline (ELAVIL) 25 mg tablet, Take 25 mg by mouth nightly., Disp: , Rfl:     predniSONE (DELTASONE) 10 mg tablet, Take  by mouth daily (with breakfast). Taking 1/2 tab, Disp: , Rfl:     flash glucose sensor (FreeStyle Paresh 2 Sensor) kit, Use as directed, Disp: 2 Kit, Rfl: 12    dapagliflozin (FARXIGA) 10 mg tab, Take  by mouth., Disp: , Rfl:     amLODIPine (NORVASC) 5 mg tablet, Take 5 mg by mouth daily. , Disp: , Rfl:     ergocalciferol (VITAMIN D2) 50,000 unit capsule, Take 50,000 Units by mouth two (2) times a week., Disp: , Rfl:   No Known Allergies  Review of Systems   All other systems reviewed and are negative. Physical Exam  Vitals and nursing note reviewed. Chest:   Breasts: Breasts are symmetrical.      Right: No inverted nipple, mass, nipple discharge, skin change, tenderness, axillary adenopathy or supraclavicular adenopathy. Left: No inverted nipple, mass, nipple discharge, skin change, tenderness, axillary adenopathy or supraclavicular adenopathy. Lymphadenopathy:      Cervical: No cervical adenopathy. Upper Body:      Right upper body: No supraclavicular, axillary or pectoral adenopathy. Left upper body: No supraclavicular, axillary or pectoral adenopathy. BREAST ULTRASOUND, Preop planning  Indication:preop planning  right Breast 1:00 upper inner   Technique: The area was scanned using a high-frequency linear-array near-field transducer  Findings:clip seen   Impression: Biopsy site visible with ultrasound  Disposition:  Will schedule mri followed by right breast us guided excisional biopsy  ASSESSMENT and PLAN    ICD-10-CM ICD-9-CM    1. Atypical hyperplasia of breast  N60.99 611.1 MRI BREAST BI W WO CONT     65 yo female with right breast ADH.    Discussed need for screening mri   And excisional biopsy  Plan will be us guided with periareolar incision. I will call her after breast mri  30 minutes was spent with patient on counseling and coordination of care.

## 2022-05-02 NOTE — PATIENT INSTRUCTIONS
Mammogram: About This Test  What is it? A mammogram is an X-ray of the breast that is used to screen for breast cancer. This test can find tumors that are too small for you or your doctor to feel. Cancer is most easily treated when it is found at an early stage. Why is this test done? A mammogram is done to:  · Look for breast cancer when there are no symptoms. · Find breast cancer when there are symptoms. Symptoms of breast cancer may include a lump or thickening in the breast, nipple discharge, or dimpling of the skin on one area of the breast.  · Find an area of suspicious breast tissue to remove for an exam under a microscope (biopsy). How do you prepare for the test?  If you've had a mammogram before at another clinic, have the results sent or bring them with you to your appointment. On the day of the mammogram, don't use any deodorant. And don't use perfume, powders, or ointments near or on your breasts. The residue left on your skin by these substances may interfere with the X-rays. How is the test done? · You will need to take off any jewelry that might interfere with the X-ray pictures. · You will need to take off your clothes above the waist.  · You will be given a cloth or paper gown to use during the test.  · You probably will stand during the mammogram.  · One at a time, your breasts will be placed on a flat plate. · Another plate is then pressed firmly against your breast to help flatten out the breast tissue. You may be asked to lift your arm. · For a few seconds while the X-ray picture is being taken, you will need to hold your breath. · At least two pictures are taken of each breast. One is taken from the top and one from the side. How does having a mammogram feel? A mammogram is often uncomfortable but rarely painful.  If you have sensitive or fragile skin or a skin condition, let the technician know before you have your exam. If you have menstrual periods, the procedure is more comfortable when done within 2 weeks after your period has ended. Having your breasts flattened is usually uncomfortable, but it helps the technician get the best images. How long does the test take? · The test will take about 10 to 15 minutes. You may be in the clinic for up to an hour. · You may be asked to wait a few minutes while the images are checked to make sure they don't need to be redone. What happens after the test?  · You will probably be able to go home right away. · You can go back to your usual activities right away. Follow-up care is a key part of your treatment and safety. Be sure to make and go to all appointments, and call your doctor if you are having problems. It's also a good idea to keep a list of the medicines you take. Ask your doctor when you can expect to have your test results. Where can you learn more? Go to http://www.miranda.com/  Enter Z238 in the search box to learn more about \"Mammogram: About This Test.\"  Current as of: September 8, 2021               Content Version: 13.2  © 9450-1612 [employer]. Care instructions adapted under license by [employer] (which disclaims liability or warranty for this information). If you have questions about a medical condition or this instruction, always ask your healthcare professional. [address] any warranty or liability for your use of this information.

## 2022-05-02 NOTE — PROGRESS NOTES
Chief Complaint   Patient presents with    Diabetes       Patient was last seen: 3 months ago -1/31/2022     General:   Is still on 5mg prednisone  Has been the only thing that has worked for Rheumatoid Arthritis (off [address] now) also issue with platelets dropping with MTX    Had breast biopsy last week    A1c: last a1c was 10.9     DM Medications:    Toujeo max 62 AM  Glipizide 5mg 2 BID AC (no missing 2nd dose now)F0  Farxiga 10mg every day   Ozempic 1mg/wk (was janumet switched early '21- too expensive)    Last Changes: no recent changes    Sugar Checks: checks more than 4 times a day and uses [employer] CGM -2 came off so no recent use    AM: does not check sugars last checked 161 mid day (shared someone's meter)    PM: does not check sugars     LOWs:  denies low sugars     DIET: is working on it, feels does well with diabetic diet     EXERCISE: does not exercise     HTN: at goal, on ACE-I     LIPIDS: at goal, on statin, lipids followed by PCP    RENAL: has normal renal function, has normal SUHA-r , in the past year, is on an ACE-I     EYES: overdue for eye exam     FEET: has no current issues, no numbness or tingling     DENTAL:  has seen dentist in past year     HEART:  no chest pain, shortness of breath or claudication, has no cardiac history     ASA:  does not take aspirin or other blood thinner     SYMPTOMS: no polyuria, thirst or blurred vision     THYROID: no known thyroid issue    DIABETES HISTORY:   From initial visit at [facility]:  Diagnosed '07  Started off on metformin and glyburide  was on avandia but stopped b/c heart risks (no wt gain or swelling)  Insulin started 6m -1 yr ago  Was on januvia in past - she just stopped taking (b/c heard could cause cancer0  No starlix or prandin  Only 70/30 for insulin type  No GLP1  Working out in past did not help sugars      LABS/STUDIES:  7/30/21 a1c 8.9, TSH 1.5  10/24/20 chol 184/133/64/97, SUHA-r 28, a1c 9.8  5/5/21 a1c 8.3  8/23/21 a1c 8.6, GFR > 60, Chol 170/201/52/84  11/21/21 GFR > 60, TSH 0.4, Vit D 34 (other labs too)    LABS:    Lab Results   Component Value Date/Time    Hemoglobin A1c 10.9 (H) 01/31/2022 03:35 PM    Glucose 225 (H) 01/31/2022 03:35 PM    GFR est  01/31/2022 03:35 PM    GFR est non- 01/31/2022 03:35 PM    Microalb/Creat ratio (ug/mg creat.) 20 01/31/2022 03:35 PM    LDL, calculated 94 01/31/2022 03:35 PM    Creatinine 0.46 (L) 01/31/2022 03:35 PM        Past Medical History:   Diagnosis Date    Hypercholesterolemia     on meds    Hyperlipidemia     Hypertension     on meds     Ill-defined disease     lumbosacral pain d/t degenerative change in lumbar spine    Type 2 diabetes mellitus ([facility] [address]       Employer:  [employer]     Blood pressure (!) 156/80, pulse (!) 104, height 5' 1\" (1.549 m), weight 173 lb (78.5 kg), last menstrual period 08/01/2007. Weight Metrics 5/2/2022 5/2/2022 1/31/2022 12/19/2018 12/13/2017 11/30/2016 11/25/2015   Weight 169 lb 173 lb 176 lb 182 lb 9.6 oz 185 lb 3.2 oz 180 lb 9.6 oz 177 lb 12.8 oz   BMI 31.93 kg/m2 32.69 kg/m2 33.25 kg/m2 34.5 kg/m2 34.99 kg/m2 34.12 kg/m2 33.61 kg/m2        EXAM  - GENERAL: NCAT, Appears well nourished   - EYES: EOMI, non-icteric, no proptosis   - Ear/Nose/Throat: NCAT, no visible inflammation or masses   - CARDIOVASCULAR: no cyanosis, no visible JVD   - RESPIRATORY: respiratory effort normal without any distress or labored breathing   - MUSCULOSKELETAL: Normal ROM of neck and upper extremities observed   - SKIN: No rash on face  - NEUROLOGIC:  No facial asymmetry (Cranial nerve 7 motor function), No gaze palsy   - PSYCHIATRIC: Normal affect, Normal insight and judgement    Assessment/Plan:   1.  Type 2 diabetes mellitus with hyperglycemia, with long-term current use of insulin ([facility] [address]  Refer to [facility] for more education  Issues with Jayna so no data to work with  Can get another one now so will put on   [address] for using her phone as reader and wrote how to connect to my [place] [employer] view professional account  After back on can let me know and I can review her sugars in order to make adjustments  a1c today  Increase ozempic to 2.0mg once available    2. Primary hypertension  On ACE-I    3. Mixed hyperlipidemia  At goal on statin       Orders Placed This Encounter    HEMOGLOBIN A1C WITH EAG      Follow-up and Dispositions    · Return in about 3 months (around 8/2/2022).

## 2022-05-03 LAB
EST. AVERAGE GLUCOSE BLD GHB EST-MCNC: 235 MG/DL
HBA1C MFR BLD: 9.8 % (ref 4.8–5.6)

## 2022-05-12 RX ORDER — INSULIN GLARGINE 300 U/ML
62 INJECTION, SOLUTION SUBCUTANEOUS DAILY
Qty: 24 ML | Refills: 3 | Status: SHIPPED | OUTPATIENT
Start: 2022-05-12 | End: 2022-08-08 | Stop reason: SDUPTHER

## 2022-05-17 ENCOUNTER — APPOINTMENT (OUTPATIENT)
Dept: MRI IMAGING | Age: 62
End: 2022-05-17
Attending: SURGERY

## 2022-05-17 ENCOUNTER — HOSPITAL ENCOUNTER (OUTPATIENT)
Dept: MRI IMAGING | Age: 62
Discharge: HOME | End: 2022-05-17
Attending: SURGERY
Payer: COMMERCIAL

## 2022-05-17 VITALS — WEIGHT: 170 LBS | BODY MASS INDEX: 32.12 KG/M2

## 2022-05-17 DIAGNOSIS — N60.99 ATYPICAL HYPERPLASIA OF BREAST: ICD-10-CM

## 2022-05-17 PROCEDURE — 74011250636 HC RX REV CODE- 250/636

## 2022-05-17 PROCEDURE — 74011000258 HC RX REV CODE- 258: Performed by: SURGERY

## 2022-05-17 PROCEDURE — A9576 INJ PROHANCE MULTIPACK: HCPCS

## 2022-05-17 PROCEDURE — 74011000250 HC RX REV CODE- 250: Performed by: SURGERY

## 2022-05-17 PROCEDURE — 77049 MRI BREAST C-+ W/CAD BI: CPT

## 2022-05-17 RX ORDER — SODIUM CHLORIDE 0.9 % (FLUSH) 0.9 %
10 SYRINGE (ML) INJECTION ONCE
Status: COMPLETED | OUTPATIENT
Start: 2022-05-17 | End: 2022-05-17

## 2022-05-17 RX ADMIN — GADOTERIDOL 15 ML: 279.3 INJECTION, SOLUTION INTRAVENOUS at 13:53

## 2022-05-17 RX ADMIN — SODIUM CHLORIDE, PRESERVATIVE FREE 10 ML: 5 INJECTION INTRAVENOUS at 13:53

## 2022-05-17 RX ADMIN — SODIUM CHLORIDE 100 ML: 900 INJECTION, SOLUTION INTRAVENOUS at 13:53

## 2022-06-01 ENCOUNTER — HOSPITAL ENCOUNTER (OUTPATIENT)
Dept: PREADMISSION TESTING | Age: 62
Discharge: HOME | End: 2022-06-01
Payer: COMMERCIAL

## 2022-06-01 VITALS
HEIGHT: 61 IN | TEMPERATURE: 98.2 F | DIASTOLIC BLOOD PRESSURE: 88 MMHG | SYSTOLIC BLOOD PRESSURE: 154 MMHG | WEIGHT: 174.16 LBS | HEART RATE: 89 BPM | BODY MASS INDEX: 32.88 KG/M2

## 2022-06-01 LAB
ANION GAP SERPL CALC-SCNC: 6 MMOL/L (ref 5–15)
ATRIAL RATE: 87 BPM
BASOPHILS # BLD: 0 K/UL (ref 0–0.1)
BASOPHILS NFR BLD: 0 % (ref 0–1)
BUN SERPL-MCNC: 16 MG/DL (ref 6–20)
BUN/CREAT SERPL: 34 (ref 12–20)
CALCIUM SERPL-MCNC: 9.6 MG/DL (ref 8.5–10.1)
CALCULATED P AXIS, ECG09: 24 DEGREES
CALCULATED R AXIS, ECG10: -29 DEGREES
CALCULATED T AXIS, ECG11: 70 DEGREES
CHLORIDE SERPL-SCNC: 107 MMOL/L (ref 97–108)
CO2 SERPL-SCNC: 26 MMOL/L (ref 21–32)
CREAT SERPL-MCNC: 0.47 MG/DL (ref 0.55–1.02)
DIAGNOSIS, 93000: NORMAL
DIFFERENTIAL METHOD BLD: ABNORMAL
EOSINOPHIL # BLD: 0.1 K/UL (ref 0–0.4)
EOSINOPHIL NFR BLD: 2 % (ref 0–7)
ERYTHROCYTE [DISTWIDTH] IN BLOOD BY AUTOMATED COUNT: 13 % (ref 11.5–14.5)
GLUCOSE SERPL-MCNC: 152 MG/DL (ref 65–100)
HCT VFR BLD AUTO: 41.4 % (ref 35–47)
HGB BLD-MCNC: 13.2 G/DL (ref 11.5–16)
IMM GRANULOCYTES # BLD AUTO: 0 K/UL (ref 0–0.04)
IMM GRANULOCYTES NFR BLD AUTO: 0 % (ref 0–0.5)
LYMPHOCYTES # BLD: 1.1 K/UL (ref 0.8–3.5)
LYMPHOCYTES NFR BLD: 17 % (ref 12–49)
MCH RBC QN AUTO: 30.6 PG (ref 26–34)
MCHC RBC AUTO-ENTMCNC: 31.9 G/DL (ref 30–36.5)
MCV RBC AUTO: 95.8 FL (ref 80–99)
MONOCYTES # BLD: 0.5 K/UL (ref 0–1)
MONOCYTES NFR BLD: 8 % (ref 5–13)
NEUTS SEG # BLD: 4.5 K/UL (ref 1.8–8)
NEUTS SEG NFR BLD: 73 % (ref 32–75)
NRBC # BLD: 0 K/UL (ref 0–0.01)
NRBC BLD-RTO: 0 PER 100 WBC
P-R INTERVAL, ECG05: 160 MS
PLATELET # BLD AUTO: 147 K/UL (ref 150–400)
PMV BLD AUTO: 10 FL (ref 8.9–12.9)
POTASSIUM SERPL-SCNC: 3.7 MMOL/L (ref 3.5–5.1)
Q-T INTERVAL, ECG07: 376 MS
QRS DURATION, ECG06: 102 MS
QTC CALCULATION (BEZET), ECG08: 452 MS
RBC # BLD AUTO: 4.32 M/UL (ref 3.8–5.2)
SODIUM SERPL-SCNC: 139 MMOL/L (ref 136–145)
VENTRICULAR RATE, ECG03: 87 BPM
WBC # BLD AUTO: 6.3 K/UL (ref 3.6–11)

## 2022-06-01 PROCEDURE — 93005 ELECTROCARDIOGRAM TRACING: CPT

## 2022-06-01 PROCEDURE — 85025 COMPLETE CBC W/AUTO DIFF WBC: CPT

## 2022-06-01 PROCEDURE — 80048 BASIC METABOLIC PNL TOTAL CA: CPT

## 2022-06-01 NOTE — PERIOP NOTES
[address] INSTRUCTIONS    Surgery Date:   6/7/22    [facility] staff will call you between 4 PM- 8 PM the day before surgery with your arrival time. If your surgery is on a Monday, we will call you the preceding Friday. Please call [phone] after 8 PM if you did not receive your arrival time. 1. Please report to [facility] Patient Access/Admitting on the 1st floor. Bring your insurance card, photo identification, and any copayment ( if applicable). 2. If you are going home the same day of your surgery, you must have a responsible adult to drive you home. You need to have a responsible adult to stay with you the first 24 hours after surgery and you should not drive a car for 24 hours following your surgery. 3. Do NOT eat any solid foods after midnight the night before surgery including candy, mint or gum. You may drink clear liquids from midnight until 1 hour prior to your arrival. You may drink up to 12 ounces at one time every 4 hours. Please note special instructions, if applicable, below for medications. 4. Do NOT drink alcohol or smoke 24 hours before surgery. STOP smoking for 14 days prior as it helps with breathing and healing after surgery. 5. If you are being admitted to the hospital, please leave personal belongings/luggage in your car until you have an assigned hospital room number. 6. Please wear comfortable clothes. Wear your glasses instead of contacts. We ask that all money, jewelry and valuables be left at home. Wear no make up, particularly mascara, the day of surgery. 7.  All body piercings, rings, and jewelry need to be removed and left at home. Please remove any nail polish or artifical nails from your fingernails. Please wear your hair loose or down. Please no pony-tails, buns, or any metal hair accessories. If you shower the morning of surgery, please do not apply any lotions or powders afterwards. You may wear deodorant, unless having breast surgery.   Do not shave any body area within 24 hours of your surgery. 8. Please follow all instructions to avoid any potential surgical cancellation. 9. Should your physical condition change, (i.e. fever, cold, flu, etc.) please notify your surgeon as soon as possible. 10. It is important to be on time. If a situation occurs where you may be delayed, please call:  [phone] / [phone] on the day of surgery. 11. The Preadmission Testing staff can be reached at [phone]. 12. Special instructions: NONE      Current Outpatient Medications   Medication Sig    Toujeo Max U-300 SoloStar 300 unit/mL (3 mL) inpn 62 Units by SubCUTAneous route daily.  leflunomide (ARAVA) 20 mg tablet Take 20 mg by mouth daily.  predniSONE (DELTASONE) 5 mg tablet Take 5 mg by mouth daily.  Ozempic 1 mg/dose (4 mg/3 mL) pnij 1 mg by SubCUTAneous route every seven (7) days.  flash glucose scanning reader (FreeStyle Paresh 2 [place]) misc Use as directed by MD    ibuprofen (MOTRIN) 600 mg tablet Take 600 mg by mouth every six (6) hours as needed.  lisinopriL (PRINIVIL, ZESTRIL) 20 mg tablet Take 20 mg by mouth daily.  glipiZIDE (GLUCOTROL) 5 mg tablet Take  by mouth two (2) times a day.  Insulin Needles, Disposable, (Idania Pen Needle) 32 gauge x 5/32\" ndle by Does Not Apply route.  pen needle, diabetic (Flora Christophe 32) by Does Not Apply route.  atorvastatin (LIPITOR) 10 mg tablet Take 10 mg by mouth daily.  amitriptyline (ELAVIL) 25 mg tablet Take 25 mg by mouth nightly.  flash glucose sensor (FreeStyle Paresh 2 Sensor) kit Use as directed    dapagliflozin (FARXIGA) 10 mg tab Take  by mouth.  amLODIPine (NORVASC) 5 mg tablet Take 5 mg by mouth daily.  ergocalciferol (VITAMIN D2) 50,000 unit capsule Take 50,000 Units by mouth two (2) times a week. No current facility-administered medications for this encounter.         1. YOU MUST ONLY TAKE THESE MEDICATIONS THE MORNING OF SURGERY WITH A SIP OF WATER:AMLODIPINE, ATORVASTATIN, PREDNISONE  2. MEDICATIONS TO TAKE THE MORNING OF SURGERY ONLY IF NEEDED:NONE  3. HOLD these prescription medications BEFORE Surgery:NONE  4. Ask your surgeon/prescribing physician about when/if to STOP taking these medications:INSULIN  5. Stop all vitamins, herbal medicines and Aspirin containing products 7 days prior to surgery. Stop any non-steroidal anti-inflammatory drugs (i.e. Ibuprofen, Naproxen, Advil, Aleve) 3 days before surgery. You may take Tylenol. 6. STOP VITAMIN D ON 6/1/22  7. If you are currently taking Plavix, Coumadin,or any other blood-thinning/anticoagulant medication contact your prescribing physician for instructions. Eating and Drinking Before Surgery     You may eat a regular dinner at the usual time on the day before your surgery.  Do NOT eat any solid foods after midnight unless your arrival time at the hospital is 3pm or later.  You may drink clear liquids only from 12 midnight until 1 hours prior to your arrival time at the hospital on the day of your surgery. Do NOT drink alcohol.  Clear liquids include:  o Water  o Fruit juices without pulp( i.e. apple juice)  o Carbonated beverages  o Black coffee (no cream/milk)  o Tea (no cream/milk)  o Gatorade   You may drink up to 12-16 ounces at one time every 4 hours between the hours of midnight and 1 hour before your arrival time at the hospital. Example- if your arrival time at the hospital is 6am, you may drink 12-16 ounces of clear liquids no later than 5am.   If your arrival time at the hospital is 3pm or later, you may eat a light breakfast before 8am.   A light breakfast includes:  o Toast or bagel (no butter)  o Black coffee (no cream/milk)  o Tea (no cream/milk)  o Fruit juices without pulp ( i.e. apple juice)  o Do NOT eat meat, eggs, vegetables or fruit   If you have any questions, please contact your surgeon's office.       Preventing Infections Before and After - Your Surgery    IMPORTANT INSTRUCTIONS    You play an important role in your health and preparation for surgery. To reduce the germs on your skin you will need to shower with CHG soap (Chorhexidine gluconate 4%) two times before surgery. CHG soap (Hibiclens, Hex-A-Clens or store brand)   CHG soap will be provided at your Preadmission Testing (PAT) appointment.  If you do not have a PAT appointment before surgery, you may arrange to  CHG soap from our office or purchase CHG soap at a pharmacy, grocery or department store.  You need to purchase TWO 4 ounce bottles to use for your 2 showers. Steps to follow:  1. Wash your hair with your normal shampoo and your body with regular soap and rinse well to remove shampoo and soap from your skin. 2. Wet a clean washcloth and turn off the shower. 3. Put CHG soap on washcloth and apply to your entire body from the neck down. Do not use on your head, face or private parts(genitals). Do not use CHG soap on open sores, wounds or areas of skin irritation. 4. Wash you body gently for 5 minutes. Do not wash your skin too hard. This soap does not create lather. Pay special attention to your underarms and from your belly button to your feet. 5. Turn the shower back on and rinse well to get CHG soap off your body. 6. Pat your skin dry with a clean, dry towel. Do not apply lotions or moisturizer. 7. Put on clean clothes and sleep on fresh bed sheets and do not allow pets to sleep with you. Shower with CHG soap 2 times before your surgery   The evening before your surgery   The morning of your surgery      Tips to help prevent infections after your surgery:  1. Protect your surgical wound from germs:  ? Hand washing is the most important thing you and your caregivers can do to prevent infections. ? Keep your bandage clean and dry! ? Do not touch your surgical wound.   2. Use clean, freshly washed towels and washcloths every time you shower; do not share bath linens with others. 3. Until your surgical wound is healed, wear clothing and sleep on bed linens each day that are clean and freshly washed. 4. Do not allow pets to sleep in your bed with you or touch your surgical wound. 5. Do not smoke - smoking delays wound healing. This may be a good time to stop smoking. 6. If you have diabetes, it is important for you to manage your blood sugar levels properly before your surgery as well as after your surgery. Poorly managed blood sugar levels slow down wound healing and prevent you from healing completely. Patient Information Regarding COVID Restrictions      Day of Procedure     Please park in the parking deck or any designated visitor parking lot.  Enter the facility through the [employer] of the [facility].   On the day of surgery, please provide the cell phone number of the person who will be waiting for you to the Patient Access representative at the time of registration.  Please wear a mask on the day of your procedure.  We are now allowing two designated visitors per stay. Pediatric patients may have 2 designated visitors. These two people may come in with you on the day of your procedure.  No visitors under the age of 13.  The designated visitor must also wear a mask.  Once your procedure and the immediate recovery period is completed, a nurse in the recovery area will contact your designated visitor to inform them of your room number or to otherwise review other pertinent information regarding your care.  Social distancing practices are to be adhered to in waiting areas and the cafeteria. The patient was contacted  in person. She verbalized understanding of all instructions does not  need reinforcement.

## 2022-06-02 DIAGNOSIS — E11.65 TYPE 2 DIABETES MELLITUS WITH HYPERGLYCEMIA, WITH LONG-TERM CURRENT USE OF INSULIN (HCC): Primary | ICD-10-CM

## 2022-06-02 DIAGNOSIS — Z79.4 TYPE 2 DIABETES MELLITUS WITH HYPERGLYCEMIA, WITH LONG-TERM CURRENT USE OF INSULIN (HCC): Primary | ICD-10-CM

## 2022-06-02 RX ORDER — SEMAGLUTIDE 2.68 MG/ML
INJECTION, SOLUTION SUBCUTANEOUS
Qty: 1 EACH | Refills: 5 | Status: SHIPPED | OUTPATIENT
Start: 2022-06-02 | End: 2022-08-08 | Stop reason: ALTCHOICE

## 2022-06-06 ENCOUNTER — TELEPHONE (OUTPATIENT)
Dept: ENDOCRINOLOGY | Age: 62
End: 2022-06-06

## 2022-06-06 RX ORDER — MORPHINE SULFATE 2 MG/ML
2 INJECTION, SOLUTION INTRAMUSCULAR; INTRAVENOUS
Status: CANCELLED | OUTPATIENT
Start: 2022-06-06

## 2022-06-06 RX ORDER — FENTANYL CITRATE 50 UG/ML
25 INJECTION, SOLUTION INTRAMUSCULAR; INTRAVENOUS
Status: CANCELLED | OUTPATIENT
Start: 2022-06-06

## 2022-06-06 RX ORDER — DIPHENHYDRAMINE HYDROCHLORIDE 50 MG/ML
12.5 INJECTION, SOLUTION INTRAMUSCULAR; INTRAVENOUS AS NEEDED
Status: CANCELLED | OUTPATIENT
Start: 2022-06-06 | End: 2022-06-06

## 2022-06-06 RX ORDER — SODIUM CHLORIDE, SODIUM LACTATE, POTASSIUM CHLORIDE, CALCIUM CHLORIDE 600; 310; 30; 20 MG/100ML; MG/100ML; MG/100ML; MG/100ML
125 INJECTION, SOLUTION INTRAVENOUS CONTINUOUS
Status: CANCELLED | OUTPATIENT
Start: 2022-06-06

## 2022-06-06 RX ORDER — ONDANSETRON 2 MG/ML
4 INJECTION INTRAMUSCULAR; INTRAVENOUS AS NEEDED
Status: CANCELLED | OUTPATIENT
Start: 2022-06-06

## 2022-06-06 RX ORDER — HYDROMORPHONE HYDROCHLORIDE 1 MG/ML
0.2 INJECTION, SOLUTION INTRAMUSCULAR; INTRAVENOUS; SUBCUTANEOUS
Status: CANCELLED | OUTPATIENT
Start: 2022-06-06

## 2022-06-06 RX ORDER — SODIUM CHLORIDE 0.9 % (FLUSH) 0.9 %
5-40 SYRINGE (ML) INJECTION EVERY 8 HOURS
Status: CANCELLED | OUTPATIENT
Start: 2022-06-06

## 2022-06-06 RX ORDER — GLIPIZIDE 5 MG/1
5 TABLET ORAL 2 TIMES DAILY
Qty: 180 TABLET | Refills: 3 | Status: SHIPPED | OUTPATIENT
Start: 2022-06-06 | End: 2022-08-08 | Stop reason: SDUPTHER

## 2022-06-06 RX ORDER — SODIUM CHLORIDE 9 MG/ML
1000 INJECTION, SOLUTION INTRAVENOUS CONTINUOUS
Status: CANCELLED | OUTPATIENT
Start: 2022-06-06

## 2022-06-06 RX ORDER — SODIUM CHLORIDE 0.9 % (FLUSH) 0.9 %
5-40 SYRINGE (ML) INJECTION AS NEEDED
Status: CANCELLED | OUTPATIENT
Start: 2022-06-06

## 2022-06-06 RX ORDER — MIDAZOLAM HYDROCHLORIDE 1 MG/ML
0.5 INJECTION, SOLUTION INTRAMUSCULAR; INTRAVENOUS
Status: CANCELLED | OUTPATIENT
Start: 2022-06-06

## 2022-06-06 RX ORDER — OXYCODONE AND ACETAMINOPHEN 5; 325 MG/1; MG/1
1 TABLET ORAL AS NEEDED
Status: CANCELLED | OUTPATIENT
Start: 2022-06-06

## 2022-06-06 NOTE — TELEPHONE ENCOUNTER
6/6/2022  11:00 AM     Patient called to ask dr. Brett Lao to send a script in [facility]ide [facility]- she advised that she is completely out of meds and would like a call back at [phone] thanks

## 2022-06-06 NOTE — TELEPHONE ENCOUNTER
6/6/2022  11:00 AM    Patient called to ask dr. Simi New to send a script in [facility] her [facility]ide [facility]- she advised that she is completely out of meds and would like a call back at [phone] thanks

## 2022-06-07 ENCOUNTER — ANESTHESIA EVENT (OUTPATIENT)
Dept: MEDSURG UNIT | Age: 62
End: 2022-06-07
Payer: COMMERCIAL

## 2022-06-07 ENCOUNTER — HOSPITAL ENCOUNTER (OUTPATIENT)
Age: 62
Setting detail: OUTPATIENT SURGERY
Discharge: HOME | End: 2022-06-07
Attending: SURGERY | Admitting: SURGERY
Payer: COMMERCIAL

## 2022-06-07 ENCOUNTER — ANESTHESIA (OUTPATIENT)
Dept: MEDSURG UNIT | Age: 62
End: 2022-06-07
Payer: COMMERCIAL

## 2022-06-07 VITALS
WEIGHT: 174 LBS | HEIGHT: 61 IN | RESPIRATION RATE: 12 BRPM | TEMPERATURE: 97.3 F | BODY MASS INDEX: 32.85 KG/M2 | SYSTOLIC BLOOD PRESSURE: 136 MMHG | OXYGEN SATURATION: 96 % | HEART RATE: 80 BPM | DIASTOLIC BLOOD PRESSURE: 74 MMHG

## 2022-06-07 DIAGNOSIS — N60.99 ATYPICAL DUCTAL HYPERPLASIA OF BREAST: ICD-10-CM

## 2022-06-07 LAB
GLUCOSE BLD STRIP.AUTO-MCNC: 107 MG/DL (ref 65–117)
GLUCOSE BLD STRIP.AUTO-MCNC: 98 MG/DL (ref 65–117)
SERVICE CMNT-IMP: NORMAL
SERVICE CMNT-IMP: NORMAL

## 2022-06-07 PROCEDURE — 77030002933 HC SUT MCRYL J&J -A: Performed by: SURGERY

## 2022-06-07 PROCEDURE — 74011000250 HC RX REV CODE- 250: Performed by: NURSE ANESTHETIST, CERTIFIED REGISTERED

## 2022-06-07 PROCEDURE — 88307 TISSUE EXAM BY PATHOLOGIST: CPT

## 2022-06-07 PROCEDURE — 19125 EXCISION BREAST LESION: CPT | Performed by: SURGERY

## 2022-06-07 PROCEDURE — 77030010507 HC ADH SKN DERMBND J&J -B: Performed by: SURGERY

## 2022-06-07 PROCEDURE — 74011250636 HC RX REV CODE- 250/636: Performed by: NURSE ANESTHETIST, CERTIFIED REGISTERED

## 2022-06-07 PROCEDURE — 77030011267 HC ELECTRD BLD COVD -A: Performed by: SURGERY

## 2022-06-07 PROCEDURE — 77030031139 HC SUT VCRL2 J&J -A: Performed by: SURGERY

## 2022-06-07 PROCEDURE — 76060000061 HC AMB SURG ANES 0.5 TO 1 HR: Performed by: SURGERY

## 2022-06-07 PROCEDURE — 77030011266 HC ELECTRD BLD INSL COVD -A: Performed by: SURGERY

## 2022-06-07 PROCEDURE — C9290 INJ, BUPIVACAINE LIPOSOME: HCPCS | Performed by: SURGERY

## 2022-06-07 PROCEDURE — 77030010509 HC AIRWY LMA MSK TELE -A: Performed by: NURSE ANESTHETIST, CERTIFIED REGISTERED

## 2022-06-07 PROCEDURE — 77030011825 HC SUPP SURG PSTOP S2SG -B: Performed by: SURGERY

## 2022-06-07 PROCEDURE — 74011000250 HC RX REV CODE- 250: Performed by: SURGERY

## 2022-06-07 PROCEDURE — 74011250637 HC RX REV CODE- 250/637: Performed by: ANESTHESIOLOGY

## 2022-06-07 PROCEDURE — 76998 US GUIDE INTRAOP: CPT | Performed by: SURGERY

## 2022-06-07 PROCEDURE — 77030041680 HC PNCL ELECSURG SMK EVAC CNMD -B: Performed by: SURGERY

## 2022-06-07 PROCEDURE — 74011250636 HC RX REV CODE- 250/636: Performed by: ANESTHESIOLOGY

## 2022-06-07 PROCEDURE — 82962 GLUCOSE BLOOD TEST: CPT

## 2022-06-07 PROCEDURE — 2709999900 HC NON-CHARGEABLE SUPPLY: Performed by: SURGERY

## 2022-06-07 PROCEDURE — 88342 IMHCHEM/IMCYTCHM 1ST ANTB: CPT

## 2022-06-07 PROCEDURE — 76210000035 HC AMBSU PH I REC 1 TO 1.5 HR: Performed by: SURGERY

## 2022-06-07 PROCEDURE — 76030000000 HC AMB SURG OR TIME 0.5 TO 1: Performed by: SURGERY

## 2022-06-07 PROCEDURE — 77030040361 HC SLV COMPR DVT MDII -B: Performed by: SURGERY

## 2022-06-07 PROCEDURE — 74011250636 HC RX REV CODE- 250/636: Performed by: SURGERY

## 2022-06-07 PROCEDURE — 77030040922 HC BLNKT HYPOTHRM STRY -A

## 2022-06-07 RX ORDER — MIDAZOLAM HYDROCHLORIDE 1 MG/ML
1 INJECTION, SOLUTION INTRAMUSCULAR; INTRAVENOUS AS NEEDED
Status: DISCONTINUED | OUTPATIENT
Start: 2022-06-07 | End: 2022-06-07 | Stop reason: HOSPADM

## 2022-06-07 RX ORDER — ACETAMINOPHEN 325 MG/1
650 TABLET ORAL ONCE
Status: COMPLETED | OUTPATIENT
Start: 2022-06-07 | End: 2022-06-07

## 2022-06-07 RX ORDER — MIDAZOLAM HYDROCHLORIDE 1 MG/ML
INJECTION, SOLUTION INTRAMUSCULAR; INTRAVENOUS AS NEEDED
Status: DISCONTINUED | OUTPATIENT
Start: 2022-06-07 | End: 2022-06-07 | Stop reason: HOSPADM

## 2022-06-07 RX ORDER — LIDOCAINE HYDROCHLORIDE 10 MG/ML
0.1 INJECTION, SOLUTION EPIDURAL; INFILTRATION; INTRACAUDAL; PERINEURAL AS NEEDED
Status: DISCONTINUED | OUTPATIENT
Start: 2022-06-07 | End: 2022-06-07 | Stop reason: HOSPADM

## 2022-06-07 RX ORDER — FENTANYL CITRATE 50 UG/ML
50 INJECTION, SOLUTION INTRAMUSCULAR; INTRAVENOUS AS NEEDED
Status: DISCONTINUED | OUTPATIENT
Start: 2022-06-07 | End: 2022-06-07 | Stop reason: HOSPADM

## 2022-06-07 RX ORDER — DEXAMETHASONE SODIUM PHOSPHATE 4 MG/ML
INJECTION, SOLUTION INTRA-ARTICULAR; INTRALESIONAL; INTRAMUSCULAR; INTRAVENOUS; SOFT TISSUE AS NEEDED
Status: DISCONTINUED | OUTPATIENT
Start: 2022-06-07 | End: 2022-06-07 | Stop reason: HOSPADM

## 2022-06-07 RX ORDER — SODIUM CHLORIDE 0.9 % (FLUSH) 0.9 %
5-40 SYRINGE (ML) INJECTION AS NEEDED
Status: DISCONTINUED | OUTPATIENT
Start: 2022-06-07 | End: 2022-06-07 | Stop reason: HOSPADM

## 2022-06-07 RX ORDER — PROPOFOL 10 MG/ML
INJECTION, EMULSION INTRAVENOUS AS NEEDED
Status: DISCONTINUED | OUTPATIENT
Start: 2022-06-07 | End: 2022-06-07 | Stop reason: HOSPADM

## 2022-06-07 RX ORDER — LIDOCAINE HYDROCHLORIDE 20 MG/ML
INJECTION, SOLUTION EPIDURAL; INFILTRATION; INTRACAUDAL; PERINEURAL AS NEEDED
Status: DISCONTINUED | OUTPATIENT
Start: 2022-06-07 | End: 2022-06-07 | Stop reason: HOSPADM

## 2022-06-07 RX ORDER — ONDANSETRON 4 MG/1
4 TABLET, ORALLY DISINTEGRATING ORAL
Qty: 5 TABLET | Refills: 1 | Status: SHIPPED | OUTPATIENT
Start: 2022-06-07

## 2022-06-07 RX ORDER — ONDANSETRON 2 MG/ML
INJECTION INTRAMUSCULAR; INTRAVENOUS AS NEEDED
Status: DISCONTINUED | OUTPATIENT
Start: 2022-06-07 | End: 2022-06-07 | Stop reason: HOSPADM

## 2022-06-07 RX ORDER — SODIUM CHLORIDE 9 MG/ML
50 INJECTION, SOLUTION INTRAVENOUS CONTINUOUS
Status: DISCONTINUED | OUTPATIENT
Start: 2022-06-07 | End: 2022-06-07 | Stop reason: HOSPADM

## 2022-06-07 RX ORDER — FENTANYL CITRATE 50 UG/ML
INJECTION, SOLUTION INTRAMUSCULAR; INTRAVENOUS AS NEEDED
Status: DISCONTINUED | OUTPATIENT
Start: 2022-06-07 | End: 2022-06-07 | Stop reason: HOSPADM

## 2022-06-07 RX ORDER — DEXMEDETOMIDINE HYDROCHLORIDE 100 UG/ML
INJECTION, SOLUTION INTRAVENOUS AS NEEDED
Status: DISCONTINUED | OUTPATIENT
Start: 2022-06-07 | End: 2022-06-07 | Stop reason: HOSPADM

## 2022-06-07 RX ORDER — SODIUM CHLORIDE, SODIUM LACTATE, POTASSIUM CHLORIDE, CALCIUM CHLORIDE 600; 310; 30; 20 MG/100ML; MG/100ML; MG/100ML; MG/100ML
125 INJECTION, SOLUTION INTRAVENOUS CONTINUOUS
Status: DISCONTINUED | OUTPATIENT
Start: 2022-06-07 | End: 2022-06-07 | Stop reason: HOSPADM

## 2022-06-07 RX ORDER — SODIUM CHLORIDE, SODIUM LACTATE, POTASSIUM CHLORIDE, CALCIUM CHLORIDE 600; 310; 30; 20 MG/100ML; MG/100ML; MG/100ML; MG/100ML
INJECTION, SOLUTION INTRAVENOUS
Status: DISCONTINUED | OUTPATIENT
Start: 2022-06-07 | End: 2022-06-07 | Stop reason: HOSPADM

## 2022-06-07 RX ORDER — SODIUM CHLORIDE 0.9 % (FLUSH) 0.9 %
5-40 SYRINGE (ML) INJECTION EVERY 8 HOURS
Status: DISCONTINUED | OUTPATIENT
Start: 2022-06-07 | End: 2022-06-07 | Stop reason: HOSPADM

## 2022-06-07 RX ORDER — OXYCODONE AND ACETAMINOPHEN 5; 325 MG/1; MG/1
1 TABLET ORAL
Qty: 20 TABLET | Refills: 0 | Status: SHIPPED | OUTPATIENT
Start: 2022-06-07 | End: 2022-06-14

## 2022-06-07 RX ADMIN — ONDANSETRON HYDROCHLORIDE 4 MG: 2 INJECTION, SOLUTION INTRAMUSCULAR; INTRAVENOUS at 09:09

## 2022-06-07 RX ADMIN — LIDOCAINE HYDROCHLORIDE 40 MG: 20 INJECTION, SOLUTION EPIDURAL; INFILTRATION; INTRACAUDAL; PERINEURAL at 09:05

## 2022-06-07 RX ADMIN — FENTANYL CITRATE 50 MCG: 50 INJECTION, SOLUTION INTRAMUSCULAR; INTRAVENOUS at 09:32

## 2022-06-07 RX ADMIN — PROPOFOL 200 MG: 10 INJECTION, EMULSION INTRAVENOUS at 09:05

## 2022-06-07 RX ADMIN — ACETAMINOPHEN 650 MG: 325 TABLET ORAL at 08:24

## 2022-06-07 RX ADMIN — FENTANYL CITRATE 50 MCG: 50 INJECTION, SOLUTION INTRAMUSCULAR; INTRAVENOUS at 09:16

## 2022-06-07 RX ADMIN — SODIUM CHLORIDE, POTASSIUM CHLORIDE, SODIUM LACTATE AND CALCIUM CHLORIDE 125 ML/HR: 600; 310; 30; 20 INJECTION, SOLUTION INTRAVENOUS at 08:24

## 2022-06-07 RX ADMIN — MIDAZOLAM 2 MG: 1 INJECTION INTRAMUSCULAR; INTRAVENOUS at 08:56

## 2022-06-07 RX ADMIN — DEXAMETHASONE SODIUM PHOSPHATE 4 MG: 4 INJECTION, SOLUTION INTRAMUSCULAR; INTRAVENOUS at 09:09

## 2022-06-07 RX ADMIN — SODIUM CHLORIDE, POTASSIUM CHLORIDE, SODIUM LACTATE AND CALCIUM CHLORIDE: 600; 310; 30; 20 INJECTION, SOLUTION INTRAVENOUS at 08:49

## 2022-06-07 RX ADMIN — DEXMEDETOMIDINE HYDROCHLORIDE 10 MCG: 100 INJECTION, SOLUTION, CONCENTRATE INTRAVENOUS at 09:26

## 2022-06-07 RX ADMIN — MEPERIDINE HYDROCHLORIDE 25 MG: 50 INJECTION INTRAMUSCULAR; INTRAVENOUS; SUBCUTANEOUS at 09:25

## 2022-06-07 NOTE — ANESTHESIA POSTPROCEDURE EVALUATION
Procedure(s):  RIGHT BREAST EXCISIONAL BIOPSY WITH ULTRASOUND. general    Anesthesia Post Evaluation      Multimodal analgesia: multimodal analgesia used between 6 hours prior to anesthesia start to PACU discharge  Patient location during evaluation: PACU  Patient participation: complete - patient participated  Level of consciousness: awake  Pain score: 2  Pain management: adequate  Airway patency: patent  Anesthetic complications: no  Cardiovascular status: acceptable  Respiratory status: acceptable  Hydration status: acceptable  Comments: I have evaluated the patient and meets criteria for discharge from PACU. Julia Alba MD  Post anesthesia nausea and vomiting:  controlled  Final Post Anesthesia Temperature Assessment:  Normothermia (36.0-37.5 degrees C)      INITIAL Post-op Vital signs:   Vitals Value Taken Time   /81 06/07/22 1045   Temp 36.3 °C (97.3 °F) 06/07/22 0955   Pulse 81 06/07/22 1052   Resp 14 06/07/22 1052   SpO2 91 % 06/07/22 1052   Vitals shown include unvalidated device data.

## 2022-06-07 NOTE — OP NOTES
[address]   OPERATIVE REPORT    Name:  Jose Almaraz  MR#:  [id number]  :  1960  ACCOUNT #:  [de-identified]  DATE OF SERVICE:  2022    PREOPERATIVE DIAGNOSIS:  Right breast atypical ductal hyperplasia. POSTOPERATIVE DIAGNOSIS:  Right breast atypical ductal hyperplasia. PROCEDURE PERFORMED:  Right breast ultrasound-guided excisional biopsy. SURGEON:  Robert Spaulding MD    ASSISTANT:  Joann Kramer SA    ANESTHESIA:  General.    COMPLICATIONS:  None. SPECIMENS REMOVED:  1. Right breast excisional biopsy. 2.  Right breast anterior margin. IMPLANTS:  None. ESTIMATED BLOOD LOSS:  Minimal.    DRAINS:  None. FINDINGS:  Clip and lesion excised. INDICATION FOR PROCEDURE:  A 57-year-old female with atypical hyperplasia who needed excisional biopsy, ultrasound-guided. PROCEDURE:  The patient was seen in the preop holding area where surgical site was marked by surgeon. Informed consent was obtained. She was taken to the operating room and laid in supine position where LMA anesthesia was induced. Right breast was prepped and draped in the usual fashion. A time-out was performed. Attention was turned to the right breast where the lesion and clip were identified at 1 o'clock 6 cm from the nipple with ultrasound. A periareolar incision was made with a 10-blade. Bovie cautery was used to tunnel up to the lesion using ultrasound guidance. This was excised and marked short superior, long stitch lateral.  An additional anterior margin that included the anterior portion of the biopsy cavity was also excised. The cavity was irrigated. Exparel injection was injected into the breast tissue and skin. The deeper tissues were closed with interrupted 2-0 Vicryl, the skin with interrupted 3-0 Vicryl, and 4-0 subcuticular Monocryl. Skin glue was placed on the incision as well as a dressing and a bra. All sponges, needle, and instrument counts were correct.   The patient went to recovery room in stable condition.       Lili Busby MD      [id number]  D:  06/07/2022 9:59  T:  06/07/2022 10:31  JOB #:  [id number]

## 2022-06-07 NOTE — H&P
History and Physical    HISTORY OF PRESENT ILLNESS  Felecia Robles is a 64 y.o. female. HPI NEW patient consult referred by  Kelsie López for RIGHT breast atypia. She had calcifications found on her mammogram. These were biopsied.      STbx- 4/21/22- ATYPICAL DUCTAL HYPERPLASIA        Family History: maternal aunt had cancer not sure what type. Father had throat cancer.         Mammogram, 4/13/22, BIRADS 4a :Right breast calcification          Past Medical History:   Diagnosis Date    Hypercholesterolemia       on meds    Hyperlipidemia      Hypertension       on meds     Ill-defined disease       lumbosacral pain d/t degenerative change in lumbar spine    Type 2 diabetes mellitus ([facility] [address]              Past Surgical History:   Procedure Laterality Date    HX HYSTERECTOMY   8/2007     MIRIAM LSO    HX PELVIC LAPAROSCOPY   3/20/08     for pelvic pain and findings were negative      Social History            Socioeconomic History    Marital status: SINGLE       Spouse name: Not on file    Number of children: Not on file    Years of education: Not on file    Highest education level: Not on file   Occupational History    Not on file   Tobacco Use    Smoking status: Never Smoker    Smokeless tobacco: Never Used   Substance and Sexual Activity    Alcohol use: Yes       Alcohol/week: 0.0 standard drinks       Comment: occasionally    Drug use: No    Sexual activity: Yes       Partners: Male       Birth control/protection: Surgical   Other Topics Concern    Not on file   Social History Narrative    Not on file      Social Determinants of Health          Financial Resource Strain:     Difficulty of Paying Living Expenses: Not on file   Food Insecurity:     Worried About Running Out of Food in the Last Year: Not on file    Lily of Food in the Last Year: Not on file   Transportation Needs:     Lack of Transportation (Medical): Not on file    Lack of Transportation (Non-Medical):  Not on file   Physical Activity:     Days of Exercise per Week: Not on file    Minutes of Exercise per Session: Not on file   Stress:     Feeling of Stress : Not on file   Social Connections:     Frequency of Communication with Friends and Family: Not on file    Frequency of Social Gatherings with Friends and Family: Not on file    Attends Gnosticist Services: Not on file    Active Member of [address] [place] [address] [employer] or Organizations: Not on file    Attends Club or Organization Meetings: Not on file    Marital Status: Not on file   Intimate Partner Violence:     Fear of Current or Ex-Partner: Not on file    Emotionally Abused: Not on file    Physically Abused: Not on file    Sexually Abused: Not on file   Housing Stability:     Unable to Pay for Housing in the Last Year: Not on file    Number of [place] in the Last Year: Not on file    Unstable Housing in the Last Year: Not on file               OB History         4    Para   2    Term                AB   2    Living            SAB        IAB        Ectopic   1    Molar        Multiple        Live Births               Obstetric Comments   Menarche 12, LMP n/a, # of children 2, age of 1st delivery 1982, Hysterectomy/oophorectomy yes/yes, Breast bx yes/right/22, history of breast feeding no, BCP no, Hormone therapy no                Current Outpatient Medications:     Ozempic 1 mg/dose (4 mg/3 mL) pnij, 1 mg by SubCUTAneous route every seven (7) days. , Disp: 3 Each, Rfl: 3    flash glucose scanning reader (FreeStyle Paresh 2 [place]) misc, Use as directed by MD, Disp: 1 Each, Rfl: 0    ibuprofen (MOTRIN) 600 mg tablet, Take 600 mg by mouth every six (6) hours as needed. , Disp: , Rfl:     Toujeo Max U-300 SoloStar 300 unit/mL (3 mL) inpn, INJECT SUBCUTANEOUS 50UNITS ONCE DAILY, Disp: , Rfl:     lisinopriL (PRINIVIL, ZESTRIL) 20 mg tablet, Take 20 mg by mouth daily. , Disp: , Rfl:     glipiZIDE (GLUCOTROL) 5 mg tablet, Take  by mouth two (2) times a day., Disp: , Rfl:     Insulin Needles, Disposable, (Idania Pen Needle) 32 gauge x 5/32\" ndle, by Does Not Apply route., Disp: , Rfl:     pen needle, diabetic (NOVOFINE 32), by Does Not Apply route., Disp: , Rfl:     atorvastatin (LIPITOR) 10 mg tablet, Take 10 mg by mouth daily. , Disp: , Rfl:     amitriptyline (ELAVIL) 25 mg tablet, Take 25 mg by mouth nightly., Disp: , Rfl:     predniSONE (DELTASONE) 10 mg tablet, Take  by mouth daily (with breakfast). Taking 1/2 tab, Disp: , Rfl:     flash glucose sensor (FreeStyle Paresh 2 Sensor) kit, Use as directed, Disp: 2 Kit, Rfl: 12    dapagliflozin (FARXIGA) 10 mg tab, Take  by mouth., Disp: , Rfl:     amLODIPine (NORVASC) 5 mg tablet, Take 5 mg by mouth daily. , Disp: , Rfl:     ergocalciferol (VITAMIN D2) 50,000 unit capsule, Take 50,000 Units by mouth two (2) times a week., Disp: , Rfl:   No Known Allergies  Review of Systems   All other systems reviewed and are negative.        Physical Exam  Vitals and nursing note reviewed. Chest:   Breasts: Breasts are symmetrical.      Right: No inverted nipple, mass, nipple discharge, skin change, tenderness, axillary adenopathy or supraclavicular adenopathy. Left: No inverted nipple, mass, nipple discharge, skin change, tenderness, axillary adenopathy or supraclavicular adenopathy.         Lymphadenopathy:      Cervical: No cervical adenopathy. Upper Body:      Right upper body: No supraclavicular, axillary or pectoral adenopathy. Left upper body: No supraclavicular, axillary or pectoral adenopathy.       BREAST ULTRASOUND, Preop planning  Indication:preop planning  right Breast 1:00 upper inner   Technique: The area was scanned using a high-frequency linear-array near-field transducer  Findings:clip seen   Impression: Biopsy site visible with ultrasound  Disposition:  Will schedule mri followed by right breast us guided excisional biopsy  ASSESSMENT and PLAN      ICD-10-CM ICD-9-CM     1.  Atypical hyperplasia of breast  N60.99 611.1 MRI BREAST BI W WO CONT      63 yo female with right breast ADH. Discussed need for screening mri   And excisional biopsy  Plan will be us guided with periareolar incision. I will call her after breast mri  30 minutes was spent with patient on counseling and coordination of care.

## 2022-06-07 NOTE — BRIEF OP NOTE
Brief Postoperative Note    Patient: Russell Helm  YOB: 1960  MRN: [id number]    Date of Procedure: 6/7/2022     Pre-Op Diagnosis: RIGHT BREAST ATYPICAL DUCTAL HYPERPLASIA    Post-Op Diagnosis: Same as preoperative diagnosis. Procedure(s):  RIGHT BREAST EXCISIONAL BIOPSY WITH ULTRASOUND    Surgeon(s):   Malina Zaragoza MD    Surgical Assistant: Surg Asst-1: Lotus Vaughan    Anesthesia: General     Estimated Blood Loss (mL): Minimal    Complications: None    Specimens:   ID Type Source Tests Collected by Time Destination   1 : Right Breast Excisional Biopsy Fresh Breast  Malina Zaragoza MD 6/7/2022 4550 Pathology   2 : Right Breast Excisional Biopsy Anterior Margin Fresh Breast  Malina Zaragoza MD 6/7/2022 0932 Pathology        Implants: * No implants in log *    Drains: * No LDAs found *    Findings: clip and lesion removed     Electronically Signed by Jh Jenkins MD on 6/7/2022 at 9:47 AM  Dictated stat

## 2022-06-07 NOTE — ANESTHESIA PREPROCEDURE EVALUATION
Relevant Problems   CARDIOVASCULAR   (+) Essential hypertension      ENDOCRINE   (+) Diabetes mellitus type 2, controlled ([facility] [address]       Anesthetic History   No history of anesthetic complications            Review of Systems / Medical History  Patient summary reviewed, nursing notes reviewed and pertinent labs reviewed    Pulmonary  Within defined limits                 Neuro/Psych   Within defined limits           Cardiovascular  Within defined limits  Hypertension                   GI/Hepatic/Renal  Within defined limits             Comments: RA on steroids Endo/Other  Within defined limits  Diabetes    Arthritis     Other Findings              Physical Exam    Airway  Mallampati: II  TM Distance: > 6 cm  Neck ROM: normal range of motion   Mouth opening: Normal     Cardiovascular  Regular rate and rhythm,  S1 and S2 normal,  no murmur, click, rub, or gallop             Dental  No notable dental hx       Pulmonary  Breath sounds clear to auscultation               Abdominal  GI exam deferred       Other Findings            Anesthetic Plan    ASA: 3  Anesthesia type: general          Induction: Intravenous  Anesthetic plan and risks discussed with: Patient

## 2022-06-07 NOTE — DISCHARGE INSTRUCTIONS
Discharge Instructions from Dr. Alyse Borden    · I will call you with the pathology results, typically within 1 week from today. · You may shower, but no hot tubs, swimming pools, or baths until your incision is healed. · No heavy lifting with the affected extremity (nothing greater than 5 pounds), and limit its use for the next 4-5 days. · You may use an ice pack for comfort for the next couple of days, but do not place ice directly on the skin. Rather, use a towel or clothing to serve as a barrier between skin and ice to prevent injury. · If I placed a drain, follow the drain instructions provided, especially as you keep a record of the drain output. · Follow medication instructions carefully. No aspirin, ibuprofen or aleve. May take tylenol instead of narcotic. · Wear surgical bra for 24 hours, then remove. Wear supportive bra at all times. · You will have bruising and swelling  · Watch for signs of infection as listed below. · Redness  · Swelling  · Drainage from the incision or from your nipple that appears infected  · Fever over 101.5 degrees for consecutive readings, or over 99.5 if you are currently undergoing chemotherapy. · Call our office (number is below) for a follow-up appointment. · If you have any problems, our phone number is [phone]          Patient Education   Bupivacaine Liposome (By injection)   Bupivacaine Liposome ([id number]-h-lunsford LYE-poh-some)  Relieves pain after surgery. This medicine is a local anesthetic. Brand Name(s): Exparel   There may be other brand names for this medicine. When This Medicine Should Not Be Used: This medicine is not right for everyone. Do not use it if you had an allergic reaction to bupivacaine. How to Use This Medicine:   Injectable  · A nurse or other trained health professional will give you this medicine in a hospital. This medicine is given through a needle injected into the surgical site.   Drugs and Foods to Avoid:   Ask your doctor or pharmacist before using any other medicine, including over-the-counter medicines, vitamins, and herbal products. · Tell your doctor if you are also using other numbing medicines, including other kinds of bupivacaine, such as lidocaine. You should not be given any other kind of bupivacaine for at least 4 days. Warnings While Using This Medicine:   · Tell your doctor if you are pregnant or breastfeeding, or if you have kidney disease or liver disease. · This medicine may make you dizzy or drowsy. Do not drive or do anything that could be dangerous until you know how this medicine affects you. · This medicine should cause numbness only to the area where it is injected. It is not meant to cause you to fall asleep or become unconscious. · It may be easier to hurt yourself while your treated body area is still numb. Be careful to avoid injury until you have regained all the feeling and are no longer numb. Possible Side Effects While Using This Medicine:   Call your doctor right away if you notice any of these side effects:  · Allergic reaction: Itching or hives, swelling in your face or hands, swelling or tingling in your mouth or throat, chest tightness, trouble breathing  · Anxiety, depression, restlessness, drowsiness, ringing in your ears, blurred vision  · Chest pain, fast, pounding, slow, or uneven heartbeat, trouble breathing  · Lightheadedness, dizziness, fainting  · Nausea, vomiting, chills, metallic taste in your mouth  · Seizures, shivering, shaking, or tremors  If you notice these less serious side effects, talk with your doctor:   · Headache, back pain  · Trouble sleeping  If you notice other side effects that you think are caused by this medicine, tell your doctor. Call your doctor for medical advice about side effects.  You may report side effects to FDA at 9-456-FDA-4820  © 2017 [facility] Information is for End User's use only and may not be sold, redistributed or otherwise used for commercial purposes. The above information is an  only. It is not intended as medical advice for individual conditions or treatments. Talk to your doctor, nurse or pharmacist before following any medical regimen to see if it is safe and effective for you.

## 2022-06-14 ENCOUNTER — TELEPHONE (OUTPATIENT)
Dept: SURGERY | Age: 62
End: 2022-06-14

## 2022-06-14 NOTE — TELEPHONE ENCOUNTER
Called and spoke with patient. Reviewed benign pathology. 1.  Right breast, excisional biopsy:        Focal ductal hyperplasia adjacent to previous biopsy site (see   Comment). Flat epithelial atypia. Microcalcifications present within benign ducts. 2.  Right breast excisional biopsy, anterior margin:        Flat epithelial atypia. Biopsy site changes.        Microcalcifications present within benign ducts. Reports breast is a little sore, but is otherwise fine. Has post-op appt scheduled.

## 2022-06-21 ENCOUNTER — OFFICE VISIT (OUTPATIENT)
Dept: SURGERY | Age: 62
End: 2022-06-21
Payer: COMMERCIAL

## 2022-06-21 VITALS
BODY MASS INDEX: 32.85 KG/M2 | SYSTOLIC BLOOD PRESSURE: 132 MMHG | HEART RATE: 99 BPM | DIASTOLIC BLOOD PRESSURE: 57 MMHG | WEIGHT: 174 LBS | HEIGHT: 61 IN

## 2022-06-21 DIAGNOSIS — N60.99 ATYPICAL DUCTAL HYPERPLASIA OF BREAST: Primary | ICD-10-CM

## 2022-06-21 PROCEDURE — 99024 POSTOP FOLLOW-UP VISIT: CPT | Performed by: NURSE PRACTITIONER

## 2022-06-21 NOTE — PATIENT INSTRUCTIONS

## 2022-06-21 NOTE — PROGRESS NOTES
HISTORY OF PRESENT ILLNESS  Brigitte Garvey is a 64 y.o. female. HPI Established patient presents for a post-op visit. Breast history -   Referring - Pablo Apley   STbx - 4/21/22 -  ATYPICAL DUCTAL HYPERPLASIA  6/7/22 - RIGHT breast excisional biopsy - Dr. Perry Montgomery  1.  Right breast, excisional biopsy:        Focal ductal hyperplasia adjacent to previous biopsy site (see   Comment). Flat epithelial atypia. Microcalcifications present within benign ducts. 2.  Right breast excisional biopsy, anterior margin:        Flat epithelial atypia. Biopsy site changes.        Microcalcifications present within benign ducts.         Family history -   Maternal aunt had cancer not sure what type. Father had throat cancer. ROS    Physical Exam  Chest:             Visit Vitals  BP (!) 132/57 (BP 1 Location: Left upper arm, BP Patient Position: Sitting, BP Cuff Size: Adult)   Pulse 99   Ht 5' 1\" (1.549 m)   Wt 174 lb (78.9 kg)   LMP 08/01/2007 Comment: LMP age 55   BMI 32.88 kg/m²         ASSESSMENT and PLAN    ICD-10-CM ICD-9-CM    1. Atypical ductal hyperplasia of breast  N60.99 610.8         RIGHT breast - incision - CDI and well healed. Reviewed pathology. Discussed being followed as a high risk patient. Declined additional high risk management including referral to med onc and screening breast MRIs. Cleared for all activity and encouraged to wear a supportive bra. BSmammogram 3D in 2/2023. Would like to have annual CBE here. RTC in 1 year or sooner PRN. She is comfortable with this plan. All questions answered and she stated understanding.

## 2022-08-08 ENCOUNTER — OFFICE VISIT (OUTPATIENT)
Dept: ENDOCRINOLOGY | Age: 62
End: 2022-08-08
Payer: COMMERCIAL

## 2022-08-08 VITALS
RESPIRATION RATE: 16 BRPM | DIASTOLIC BLOOD PRESSURE: 79 MMHG | WEIGHT: 172.4 LBS | BODY MASS INDEX: 32.55 KG/M2 | SYSTOLIC BLOOD PRESSURE: 136 MMHG | HEART RATE: 100 BPM | OXYGEN SATURATION: 100 % | HEIGHT: 61 IN

## 2022-08-08 DIAGNOSIS — Z79.4 TYPE 2 DIABETES MELLITUS WITH HYPERGLYCEMIA, WITH LONG-TERM CURRENT USE OF INSULIN (HCC): Primary | ICD-10-CM

## 2022-08-08 DIAGNOSIS — E11.65 TYPE 2 DIABETES MELLITUS WITH HYPERGLYCEMIA, WITH LONG-TERM CURRENT USE OF INSULIN (HCC): Primary | ICD-10-CM

## 2022-08-08 PROCEDURE — 95251 CONT GLUC MNTR ANALYSIS I&R: CPT | Performed by: INTERNAL MEDICINE

## 2022-08-08 PROCEDURE — 99214 OFFICE O/P EST MOD 30 MIN: CPT | Performed by: INTERNAL MEDICINE

## 2022-08-08 PROCEDURE — 3046F HEMOGLOBIN A1C LEVEL >9.0%: CPT | Performed by: INTERNAL MEDICINE

## 2022-08-08 RX ORDER — GLIPIZIDE 5 MG/1
TABLET ORAL
Qty: 360 TABLET | Refills: 3 | Status: SHIPPED | OUTPATIENT
Start: 2022-08-08 | End: 2022-11-01 | Stop reason: SDUPTHER

## 2022-08-08 RX ORDER — INSULIN GLARGINE 300 U/ML
INJECTION, SOLUTION SUBCUTANEOUS
Qty: 24 ML | Refills: 3 | Status: SHIPPED | OUTPATIENT
Start: 2022-08-08 | End: 2022-11-01 | Stop reason: SDUPTHER

## 2022-08-08 RX ORDER — TIRZEPATIDE 7.5 MG/.5ML
7.5 INJECTION, SOLUTION SUBCUTANEOUS
Qty: 4 PEN | Refills: 1 | Status: SHIPPED | OUTPATIENT
Start: 2022-08-08

## 2022-08-08 RX ORDER — TIRZEPATIDE 2.5 MG/.5ML
2.5 INJECTION, SOLUTION SUBCUTANEOUS
Qty: 4 PEN | Refills: 0 | Status: SHIPPED | COMMUNITY
Start: 2022-08-08

## 2022-08-08 NOTE — PATIENT INSTRUCTIONS
Start Mounjaro  Take 2.5mg, 2 doses, once a week for 2 weeks (so the dose is 5.0mg)    Then fill the 7.5mg dose, making sure to sign up for savings card    After one month, contact me to increase to the 10.0mg dose    Also bring hand out to pharmacy for them to run it correctly

## 2022-08-08 NOTE — LETTER
8/8/2022    Patient: Kelly Sarmiento   YOB: 1960   Date of Visit: 8/8/2022     Paulette Porras MD  [id number] [facility] [address]  Via Fax: [phone]    Dear Paulette Porras MD,      Thank you for referring Ms. Kelly Sarmiento to [facility] for evaluation. My notes for this consultation are attached. If you have questions, please do not hesitate to call me. I look forward to following your patient along with you.       Sincerely,    Camille Roland MD

## 2022-08-08 NOTE — PROGRESS NOTES
Chief Complaint   Patient presents with    Follow-up    Diabetes       Vitals:    08/08/22 1519   BP: 136/79   Pulse: 100   Resp: 16   SpO2: 100%   Weight: 172 lb 6.4 oz (78.2 kg)   Height: 5' 1\" (1.549 m)   PainSc:   0 - No pain   LMP: 08/01/2007       Current Outpatient Medications on File Prior to Visit   Medication Sig Dispense Refill    ondansetron (ZOFRAN ODT) 4 mg disintegrating tablet Take 1 Tablet by mouth every eight (8) hours as needed for Nausea or Vomiting. 5 Tablet 1    glipiZIDE (GLUCOTROL) 5 mg tablet Take 1 Tablet by mouth two (2) times a day. 180 Tablet 3    semaglutide (Ozempic) 2 mg/dose (8 mg/3 mL) pnij Inject 2.0mg once a week 1 Each 5    Toujeo Max U-300 SoloStar 300 unit/mL (3 mL) inpn 62 Units by SubCUTAneous route daily. 24 mL 3    leflunomide (ARAVA) 20 mg tablet Take 20 mg by mouth daily. predniSONE (DELTASONE) 5 mg tablet Take 2.5 mg by mouth in the morning. flash glucose scanning reader (FreeStyle Paresh 2 [place]) misc Use as directed by MD 1 Each 0    lisinopriL (PRINIVIL, ZESTRIL) 20 mg tablet Take 20 mg by mouth daily. Insulin Needles, Disposable, 32 gauge x 5/32\" ndle by Does Not Apply route. pen needle, diabetic (Keyur Shivers 32) by Does Not Apply route. atorvastatin (LIPITOR) 10 mg tablet Take 10 mg by mouth daily. amitriptyline (ELAVIL) 25 mg tablet Take 25 mg by mouth nightly. flash glucose sensor (FreeStyle Paresh 2 Sensor) kit Use as directed 2 Kit 12    dapagliflozin (FARXIGA) 10 mg tab tablet Take  by mouth. amLODIPine (NORVASC) 5 mg tablet Take 5 mg by mouth daily. ergocalciferol (ERGOCALCIFEROL) 1,250 mcg (50,000 unit) capsule Take 50,000 Units by mouth two (2) times a week. No current facility-administered medications on file prior to visit. No Known Allergies        1. Have you been to the ER, urgent care clinic since your last visit? Hospitalized since your last visit? No    2.  Have you seen or consulted any other health care providers outside of the [address] [place] [address] since your last visit? Include any pap smears or colon screening.  No

## 2022-08-08 NOTE — PROGRESS NOTES
Chief Complaint   Patient presents with    Follow-up    Diabetes         Patient was last seen: 3 months ago -5/2/2022     General:   Is still on 5mg prednisone  Has been the only thing that has worked for Rheumatoid Arthritis (off [address] now) also issue with platelets dropping with MTX    A1c: last a1c was 9.8     DM Medications:    Toujeo max 62 AM  Glipizide 5mg 2 BID AC (not missing 2nd dose now)  Farxiga 10mg every day   Ozempic 2mg/wk     Last Changes: no recent changes    Sugar Checks: checks more than 4 times a day and uses [employer] CGM     AM: reports:      PM: reports:          LOWs:  denies low sugars     DIET: is working on it, feels does well with diabetic diet     EXERCISE: does not exercise     HTN: at goal, on ACE-I     LIPIDS: at goal, on statin, lipids followed by PCP    RENAL: has normal renal function, has normal SUHA-r , in the past year, is on an ACE-I     EYES: overdue for eye exam     FEET: has no current issues, no numbness or tingling     DENTAL:  has seen dentist in past year     HEART:  no chest pain, shortness of breath or claudication, has no cardiac history     ASA:  does not take aspirin or other blood thinner     SYMPTOMS: no polyuria, thirst or blurred vision     THYROID: no known thyroid issue    DIABETES HISTORY:   From initial visit at [facility]:  Diagnosed '07  Started off on metformin and glyburide  was on avandia but stopped b/c heart risks (no wt gain or swelling)  Insulin started 6m -1 yr ago  Was on januvia in past - she just stopped taking (b/c heard could cause cancer0  No starlix or prandin  Only 70/30 for insulin type  No GLP1  Working out in past did not help sugars      LABS/STUDIES:  7/30/21 a1c 8.9, TSH 1.5  10/24/20 chol 184/133/64/97, SUHA-r 28, a1c 9.8  5/5/21 a1c 8.3  8/23/21 a1c 8.6, GFR > 60, Chol 170/201/52/84  11/21/21 GFR > 60, TSH 0.4, Vit D 34 (other labs too)    LABS:    Lab Results   Component Value Date/Time    Hemoglobin A1c 9.8 (H) 05/02/2022 04:12 PM    Hemoglobin A1c 10.9 (H) 01/31/2022 03:35 PM    Glucose 152 (H) 06/01/2022 09:11 AM    Glucose (POC) 98 06/07/2022 10:01 AM    GFR est AA >60 06/01/2022 09:11 AM    GFR est non-AA >60 06/01/2022 09:11 AM    Microalb/Creat ratio (ug/mg creat.) 20 01/31/2022 03:35 PM    LDL, calculated 94 01/31/2022 03:35 PM    Creatinine 0.47 (L) 06/01/2022 09:11 AM        Past Medical History:   Diagnosis Date    Arthritis     RA    Atypical ductal hyperplasia of right breast     Autoimmune disease ([facility] [address]     RA    Hypercholesterolemia     on meds    Hyperlipidemia     Hypertension     on meds     Ill-defined disease     lumbosacral pain d/t degenerative change in lumbar spine    Type 2 diabetes mellitus ([facility] [address]       Employer:  [employer]     Blood pressure 136/79, pulse 100, resp. rate 16, height 5' 1\" (1.549 m), weight 172 lb 6.4 oz (78.2 kg), last menstrual period 08/01/2007, SpO2 100 %. Weight Metrics 8/8/2022 6/21/2022 6/7/2022 6/1/2022 5/17/2022 5/2/2022 5/2/2022   Weight 172 lb 6.4 oz 174 lb 174 lb 174 lb 2.6 oz 170 lb 169 lb 173 lb   BMI 32.57 kg/m2 32.88 kg/m2 32.88 kg/m2 32.91 kg/m2 32.12 kg/m2 31.93 kg/m2 32.69 kg/m2        EXAM  - GENERAL: NCAT, Appears well nourished   - EYES: EOMI, non-icteric, no proptosis   - Ear/Nose/Throat: NCAT, no visible inflammation or masses   - CARDIOVASCULAR: no cyanosis, no visible JVD   - RESPIRATORY: respiratory effort normal without any distress or labored breathing   - MUSCULOSKELETAL: Normal ROM of neck and upper extremities observed   - SKIN: No rash on face  - NEUROLOGIC:  No facial asymmetry (Cranial nerve 7 motor function), No gaze palsy   - PSYCHIATRIC: Normal affect, Normal insight and judgement    Assessment/Plan:   1.  Type 2 diabetes mellitus with hyperglycemia, with long-term current use of insulin ([facility] [address]  Refer to [facility] for more education - appears did not go through last time  Still not controlled - maxed out on meds  Before adding meal insulin will try Mounjaro - titrating as much as can  If does not work, will add meal insulin    2. Primary hypertension  On ACE-I    3. Mixed hyperlipidemia  At goal on statin       Orders Placed This Encounter    HEMOGLOBIN A1C WITH EAG    REFERRAL TO DIABETIC EDUCATION     Referral Priority:   Routine     Referral Type:   Consultation     Referral Reason:   Specialty Services Required     Number of Visits Requested:   1    tirzepatide (Mounjaro) 2.5 mg/0.5 mL pnij     Si.5 mg by SubCUTAneous route every seven (7) days. Dispense:  4 Pen     Refill:  0     Order Specific Question:   Expiration Date     Answer:   10/4/2023     Order Specific Question:   Lot#     Answer:   [id number]     Order Specific Question:        Answer:   Myrna Minus    tirzepatide (Mounjaro) 7.5 mg/0.5 mL pnij     Si.5 mg by SubCUTAneous route every seven (7) days. Dispense:  4 Pen     Refill:  1    Toujeo Max U-300 SoloStar 300 unit/mL (3 mL) inpn     Sig: Up to 80 units once a day     Dispense:  24 mL     Refill:  3    glipiZIDE (GLUCOTROL) 5 mg tablet     Sig: Two tablets twice a day     Dispense:  360 Tablet     Refill:  3        Follow-up and Dispositions    Return in about 3 months (around 2022).

## 2022-08-09 LAB
EST. AVERAGE GLUCOSE BLD GHB EST-MCNC: 171 MG/DL
HBA1C MFR BLD: 7.6 % (ref 4.8–5.6)

## 2022-10-31 ENCOUNTER — TELEPHONE (OUTPATIENT)
Dept: ENDOCRINOLOGY | Age: 62
End: 2022-10-31

## 2022-10-31 NOTE — TELEPHONE ENCOUNTER
10/31/2022    Pt called and left a vm at 11:14 am stating she has new insurance and she is having difficulties with medications that Dr. Lee Ann Najera prescribed. She had to switch her pharmacy from [facility] to [facility]. She is not in [facility] profile and would like a call back regarding what she needs to do. Pt can be reached at [phone].     Thanks,   Kami Hdez

## 2022-11-01 DIAGNOSIS — Z79.4 TYPE 2 DIABETES MELLITUS WITH HYPERGLYCEMIA, WITH LONG-TERM CURRENT USE OF INSULIN (HCC): ICD-10-CM

## 2022-11-01 DIAGNOSIS — E11.65 TYPE 2 DIABETES MELLITUS WITH HYPERGLYCEMIA, WITH LONG-TERM CURRENT USE OF INSULIN (HCC): ICD-10-CM

## 2022-11-01 RX ORDER — GLIPIZIDE 5 MG/1
TABLET ORAL
Qty: 360 TABLET | Refills: 3 | Status: SHIPPED | OUTPATIENT
Start: 2022-11-01

## 2022-11-01 RX ORDER — FLASH GLUCOSE SENSOR
KIT MISCELLANEOUS
Qty: 6 KIT | Refills: 3 | Status: SHIPPED | OUTPATIENT
Start: 2022-11-01 | End: 2022-11-10 | Stop reason: ALTCHOICE

## 2022-11-01 RX ORDER — INSULIN GLARGINE 300 U/ML
INJECTION, SOLUTION SUBCUTANEOUS
Qty: 24 ML | Refills: 3 | Status: SHIPPED | OUTPATIENT
Start: 2022-11-01

## 2022-11-01 NOTE — TELEPHONE ENCOUNTER
Pt called and left a  at 11:14 am stating she has new insurance and she is having difficulties with medications that Dr. Cazares Shows prescribed. She had to switch her pharmacy from [facility] to [address] [place] [address]. She is not in [facility] profile and would like a call back regarding what she needs to do. Pt can be reached at [phone].

## 2022-11-01 NOTE — TELEPHONE ENCOUNTER
I returned this call and Ms. Nikhil Serna gave me the names of the scripts she needed sent to her new pharmacy  Khoi Noland

## 2022-11-03 RX ORDER — ATORVASTATIN CALCIUM 10 MG/1
TABLET, FILM COATED ORAL
Qty: 90 TABLET | Refills: 3 | Status: SHIPPED | OUTPATIENT
Start: 2022-11-03

## 2022-11-10 ENCOUNTER — OFFICE VISIT (OUTPATIENT)
Dept: ENDOCRINOLOGY | Age: 62
End: 2022-11-10
Payer: COMMERCIAL

## 2022-11-10 VITALS
HEIGHT: 61 IN | WEIGHT: 179 LBS | BODY MASS INDEX: 33.79 KG/M2 | DIASTOLIC BLOOD PRESSURE: 85 MMHG | SYSTOLIC BLOOD PRESSURE: 154 MMHG | HEART RATE: 98 BPM

## 2022-11-10 DIAGNOSIS — E11.65 TYPE 2 DIABETES MELLITUS WITH HYPERGLYCEMIA, WITH LONG-TERM CURRENT USE OF INSULIN (HCC): Primary | ICD-10-CM

## 2022-11-10 DIAGNOSIS — Z79.4 TYPE 2 DIABETES MELLITUS WITH HYPERGLYCEMIA, WITH LONG-TERM CURRENT USE OF INSULIN (HCC): Primary | ICD-10-CM

## 2022-11-10 PROCEDURE — 3078F DIAST BP <80 MM HG: CPT | Performed by: INTERNAL MEDICINE

## 2022-11-10 PROCEDURE — 3074F SYST BP LT 130 MM HG: CPT | Performed by: INTERNAL MEDICINE

## 2022-11-10 PROCEDURE — 3051F HG A1C>EQUAL 7.0%<8.0%: CPT | Performed by: INTERNAL MEDICINE

## 2022-11-10 PROCEDURE — 95251 CONT GLUC MNTR ANALYSIS I&R: CPT | Performed by: INTERNAL MEDICINE

## 2022-11-10 PROCEDURE — 99214 OFFICE O/P EST MOD 30 MIN: CPT | Performed by: INTERNAL MEDICINE

## 2022-11-10 RX ORDER — UPADACITINIB 15 MG/1
TABLET, EXTENDED RELEASE ORAL
COMMUNITY
Start: 2022-10-28

## 2022-11-10 RX ORDER — BLOOD-GLUCOSE SENSOR
EACH MISCELLANEOUS
Qty: 6 KIT | Refills: 1 | Status: SHIPPED | OUTPATIENT
Start: 2022-11-10

## 2022-11-10 RX ORDER — TIRZEPATIDE 7.5 MG/.5ML
7.5 INJECTION, SOLUTION SUBCUTANEOUS
Qty: 4 PEN | Refills: 1 | Status: SHIPPED | OUTPATIENT
Start: 2022-11-10

## 2022-11-10 NOTE — PROGRESS NOTES
No chief complaint on file. Patient was last seen: 6 months ago -8/8/2022      Was a no-show for [address]    Just started mounjaro 2.5mg once a week  Just finished her ozempic     Has new insurance - [employer] issue     General:   Is still on 5mg prednisone  Has been the only thing that has worked for Rheumatoid Arthritis (off [address] now) also issue with platelets dropping with MTX    A1c: last a1c was 7.6    DM Medications:    Toujeo max 62 AM  Glipizide 5mg 2 BID AC (not missing 2nd dose now)  Farxiga 10mg every day - insurance issue  Mounjaro 2.5mg once a week     Last Changes: no recent changes    Sugar Checks: checks more than 4 times a day and uses [employer] CGM     AM: reports:      PM: reports:          LOWs:  denies low sugars     DIET: is working on it, feels does well with diabetic diet     EXERCISE: does not exercise     HTN: at goal, on ACE-I     LIPIDS: at goal, on statin, lipids followed by PCP    RENAL: has normal renal function, has normal SUHA-r , in the past year, is on an ACE-I     EYES: overdue for eye exam     FEET: has no current issues, no numbness or tingling     DENTAL:  has seen dentist in past year     HEART:  no chest pain, shortness of breath or claudication, has no cardiac history     ASA:  does not take aspirin or other blood thinner     SYMPTOMS: no polyuria, thirst or blurred vision     THYROID: no known thyroid issue    DIABETES HISTORY:   From initial visit at [facility]:  Diagnosed '07  Started off on metformin and glyburide  was on avandia but stopped b/c heart risks (no wt gain or swelling)  Insulin started 6m -1 yr ago  Was on januvia in past - she just stopped taking (b/c heard could cause cancer0  No starlix or prandin  Only 70/30 for insulin type  No GLP1  Working out in past did not help sugars      LABS/STUDIES:  7/30/21 a1c 8.9, TSH 1.5  10/24/20 chol 184/133/64/97, SUHA-r 28, a1c 9.8  5/5/21 a1c 8.3  8/23/21 a1c 8.6, GFR > 60, Chol 170/201/52/84  11/21/21 GFR > 60, TSH 0.4, Vit D 34 (other labs too)    LABS:    Lab Results   Component Value Date/Time    Hemoglobin A1c 7.6 (H) 08/08/2022 04:21 PM    Hemoglobin A1c 9.8 (H) 05/02/2022 04:12 PM    Hemoglobin A1c 10.9 (H) 01/31/2022 03:35 PM    Glucose 152 (H) 06/01/2022 09:11 AM    Glucose (POC) 98 06/07/2022 10:01 AM    GFR est AA >60 06/01/2022 09:11 AM    GFR est non-AA >60 06/01/2022 09:11 AM    Microalb/Creat ratio (ug/mg creat.) 20 01/31/2022 03:35 PM    LDL, calculated 94 01/31/2022 03:35 PM    Creatinine 0.47 (L) 06/01/2022 09:11 AM        Past Medical History:   Diagnosis Date    Arthritis     RA    Atypical ductal hyperplasia of right breast     Autoimmune disease ([facility] [address]     RA    Hypercholesterolemia     on meds    Hyperlipidemia     Hypertension     on meds     Ill-defined disease     lumbosacral pain d/t degenerative change in lumbar spine    Type 2 diabetes mellitus ([facility] [address]       Employer:  [employer]     Last menstrual period 08/01/2007. Weight Metrics 8/8/2022 6/21/2022 6/7/2022 6/1/2022 5/17/2022 5/2/2022 5/2/2022   Weight 172 lb 6.4 oz 174 lb 174 lb 174 lb 2.6 oz 170 lb 169 lb 173 lb   BMI 32.57 kg/m2 32.88 kg/m2 32.88 kg/m2 32.91 kg/m2 32.12 kg/m2 31.93 kg/m2 32.69 kg/m2        EXAM  - not done today     Assessment/Plan:   1. Type 2 diabetes mellitus with hyperglycemia, with long-term current use of insulin ([facility] [address]  New insurance - farxiga not covered - try jardiance    Just started mounjaro sample from 3 mo ago - reviewed instructions (waited til out of ozempic)    Upgrade to [facility] continuous glucose monitor if able    No-showed for PDH    2. Primary hypertension  On ACE-I    3. Mixed hyperlipidemia  At goal on statin       No orders of the defined types were placed in this encounter.

## 2022-11-10 NOTE — LETTER
11/10/2022    Patient: Gerri Santiago   YOB: 1960   Date of Visit: 11/10/2022     Cedric Reese MD  [address] [facility] [address]  Via Fax: [phone]    Dear Cedric Reese MD,      Thank you for referring Ms. Gerri Santiago to [facility] for evaluation. My notes for this consultation are attached. If you have questions, please do not hesitate to call me. I look forward to following your patient along with you.       Sincerely,    Carey Infante MD

## 2022-11-10 NOTE — PATIENT INSTRUCTIONS
Use 2 x 2.5mg of mounjaro samples over 2 weeks  Then fill the 7.5mg mounjaro Rx - get savings card and bring that plus instructions to the pharmacy     I sent in Bairon Zaragoza to see if preferred over Luis Curtis     I sent in the [employer] continuous glucose monitor

## 2022-11-11 LAB
EST. AVERAGE GLUCOSE BLD GHB EST-MCNC: 177 MG/DL
HBA1C MFR BLD: 7.8 % (ref 4.8–5.6)

## 2022-11-30 ENCOUNTER — TELEPHONE (OUTPATIENT)
Dept: ENDOCRINOLOGY | Age: 62
End: 2022-11-30

## 2022-11-30 NOTE — TELEPHONE ENCOUNTER
11/30/2022  2:32 PM        Pt called and left a voice mail at 2:26 pm.Pt was calling about the farxiga and jardiance  pt stated they need information from Magan Mccoy saying why she need the medication. Pt stated Magan Mccoy took her off of ozempic and wanted to get a prescription for something that start with a m but it has not been approved. Pharmacy#[phone]  EX#[phone]      Thanks,  Erik Cruz

## 2022-12-01 ENCOUNTER — DOCUMENTATION ONLY (OUTPATIENT)
Dept: ENDOCRINOLOGY | Age: 62
End: 2022-12-01

## 2022-12-01 NOTE — TELEPHONE ENCOUNTER
I returned this call and Ms. Salima Martínez said that she could not get the [facility] filled because [facility] told her that her insurance didn't cover it. I told her to take the script and the savings card to another pharmacy and have them run it properly and it should come back as a 25.00 copay. I let her know that [facility] does not use the saving card. She also said that the Jardiance needed a PA. I did a PA and it was approved. I then called the pharmacy to have them fill it.   Virgilio Bronson

## 2022-12-08 ENCOUNTER — TELEPHONE (OUTPATIENT)
Dept: ENDOCRINOLOGY | Age: 62
End: 2022-12-08

## 2022-12-08 NOTE — TELEPHONE ENCOUNTER
12/8/2022  4:47 PM    Patient called and left a voice mail at 4:35 pm.Pt stated the farxiga and the new insulin  put her on were both denied because of the cost even with the coupon. Pt stated she needs something because her sugar levels have not been right. ZB#[phone]      Thanks,  Lucio Farah

## 2022-12-09 NOTE — TELEPHONE ENCOUNTER
I attempted to return this call and reached her voice mail. I left a message letting her know that because of insurance, we switched her Emily Faustiner to Markel Ricardol and we have gotten a approval for that so she could pick that up and start taking it. I also told her that [facility] does not work with the 25.00 coupon so she will have to call around to see which other pharmacy's have any in stock and bring the coupon there along with the written script.   Della Rowland

## 2022-12-19 ENCOUNTER — APPOINTMENT (OUTPATIENT)
Dept: GENERAL RADIOLOGY | Age: 62
End: 2022-12-19
Attending: EMERGENCY MEDICINE
Payer: COMMERCIAL

## 2022-12-19 ENCOUNTER — HOSPITAL ENCOUNTER (EMERGENCY)
Age: 62
Discharge: HOME | End: 2022-12-19
Attending: EMERGENCY MEDICINE
Payer: COMMERCIAL

## 2022-12-19 VITALS
OXYGEN SATURATION: 99 % | SYSTOLIC BLOOD PRESSURE: 141 MMHG | HEART RATE: 98 BPM | DIASTOLIC BLOOD PRESSURE: 73 MMHG | BODY MASS INDEX: 33.42 KG/M2 | RESPIRATION RATE: 16 BRPM | HEIGHT: 61 IN | TEMPERATURE: 98.8 F | WEIGHT: 177 LBS

## 2022-12-19 DIAGNOSIS — Z79.4 TYPE 2 DIABETES MELLITUS WITH HYPERGLYCEMIA, WITH LONG-TERM CURRENT USE OF INSULIN (HCC): ICD-10-CM

## 2022-12-19 DIAGNOSIS — R10.2 ABDOMINAL PAIN, SUPRAPUBIC: ICD-10-CM

## 2022-12-19 DIAGNOSIS — E11.65 TYPE 2 DIABETES MELLITUS WITH HYPERGLYCEMIA, WITH LONG-TERM CURRENT USE OF INSULIN (HCC): ICD-10-CM

## 2022-12-19 DIAGNOSIS — B37.31 VAGINAL CANDIDIASIS: ICD-10-CM

## 2022-12-19 DIAGNOSIS — N89.8 VAGINAL ITCHING: Primary | ICD-10-CM

## 2022-12-19 DIAGNOSIS — K59.00 CONSTIPATION, UNSPECIFIED CONSTIPATION TYPE: ICD-10-CM

## 2022-12-19 LAB
ALBUMIN SERPL-MCNC: 3.7 G/DL (ref 3.5–5.2)
ALBUMIN/GLOB SERPL: 1.2 {RATIO} (ref 1.1–2.2)
ALP SERPL-CCNC: 90 U/L (ref 35–104)
ALT SERPL-CCNC: 28 U/L (ref 10–35)
ANION GAP SERPL CALC-SCNC: 13 MMOL/L (ref 5–15)
APPEARANCE UR: CLEAR
AST SERPL-CCNC: 33 U/L (ref 10–35)
BACTERIA URNS QL MICRO: NEGATIVE /HPF
BASOPHILS # BLD: 0 K/UL (ref 0–0.1)
BASOPHILS NFR BLD: 0 % (ref 0–1)
BILIRUB SERPL-MCNC: 0.3 MG/DL (ref 0.2–1)
BILIRUB UR QL: NEGATIVE
BUN SERPL-MCNC: 19 MG/DL (ref 8–23)
BUN/CREAT SERPL: 35 (ref 12–20)
CALCIUM SERPL-MCNC: 9.5 MG/DL (ref 8.8–10.2)
CHLORIDE SERPL-SCNC: 107 MMOL/L (ref 98–107)
CO2 SERPL-SCNC: 24 MMOL/L (ref 22–29)
COLOR UR: ABNORMAL
CREAT SERPL-MCNC: 0.54 MG/DL (ref 0.5–0.9)
DIFFERENTIAL METHOD BLD: NORMAL
EOSINOPHIL # BLD: 0.1 K/UL (ref 0–0.4)
EOSINOPHIL NFR BLD: 1 % (ref 0–7)
EPITH CASTS URNS QL MICRO: ABNORMAL /LPF
ERYTHROCYTE [DISTWIDTH] IN BLOOD BY AUTOMATED COUNT: 14.3 % (ref 11.5–14.5)
GLOBULIN SER CALC-MCNC: 3.1 G/DL (ref 2–4)
GLUCOSE SERPL-MCNC: 249 MG/DL (ref 65–100)
GLUCOSE UR STRIP.AUTO-MCNC: >1000 MG/DL
HCT VFR BLD AUTO: 38.1 % (ref 35–47)
HGB BLD-MCNC: 12.2 G/DL (ref 11.5–16)
HGB UR QL STRIP: NEGATIVE
IMM GRANULOCYTES # BLD AUTO: 0 K/UL (ref 0–0.04)
IMM GRANULOCYTES NFR BLD AUTO: 0 % (ref 0–0.5)
KETONES UR QL STRIP.AUTO: NEGATIVE MG/DL
LEUKOCYTE ESTERASE UR QL STRIP.AUTO: NEGATIVE
LIPASE SERPL-CCNC: 56 U/L (ref 13–60)
LYMPHOCYTES # BLD: 2.2 K/UL (ref 0.8–3.5)
LYMPHOCYTES NFR BLD: 31 % (ref 12–49)
MCH RBC QN AUTO: 31.1 PG (ref 26–34)
MCHC RBC AUTO-ENTMCNC: 32 G/DL (ref 30–36.5)
MCV RBC AUTO: 97.2 FL (ref 80–99)
MONOCYTES # BLD: 0.6 K/UL (ref 0–1)
MONOCYTES NFR BLD: 9 % (ref 5–13)
NEUTS SEG # BLD: 4.3 K/UL (ref 1.8–8)
NEUTS SEG NFR BLD: 59 % (ref 32–75)
NITRITE UR QL STRIP.AUTO: NEGATIVE
NRBC # BLD: 0 K/UL (ref 0–0.01)
NRBC BLD-RTO: 0 PER 100 WBC
PH UR STRIP: 5.5 [PH] (ref 5–8)
PLATELET # BLD AUTO: 297 K/UL (ref 150–400)
PMV BLD AUTO: 9.2 FL (ref 8.9–12.9)
POTASSIUM SERPL-SCNC: 3.7 MMOL/L (ref 3.5–5.1)
PROT SERPL-MCNC: 6.8 G/DL (ref 6.4–8.3)
PROT UR STRIP-MCNC: NEGATIVE MG/DL
RBC # BLD AUTO: 3.92 M/UL (ref 3.8–5.2)
RBC #/AREA URNS HPF: ABNORMAL /HPF
SODIUM SERPL-SCNC: 144 MMOL/L (ref 136–145)
SP GR UR REFRACTOMETRY: 1.01 (ref 1–1.03)
UA: UC IF INDICATED,UAUC: ABNORMAL
UROBILINOGEN UR QL STRIP.AUTO: 0.2 EU/DL (ref 0.2–1)
WBC # BLD AUTO: 7.2 K/UL (ref 3.6–11)
WBC URNS QL MICRO: ABNORMAL /HPF (ref 0–4)

## 2022-12-19 PROCEDURE — 36415 COLL VENOUS BLD VENIPUNCTURE: CPT

## 2022-12-19 PROCEDURE — 74018 RADEX ABDOMEN 1 VIEW: CPT

## 2022-12-19 PROCEDURE — 81001 URINALYSIS AUTO W/SCOPE: CPT

## 2022-12-19 PROCEDURE — 96374 THER/PROPH/DIAG INJ IV PUSH: CPT

## 2022-12-19 PROCEDURE — 80053 COMPREHEN METABOLIC PANEL: CPT

## 2022-12-19 PROCEDURE — 99284 EMERGENCY DEPT VISIT MOD MDM: CPT

## 2022-12-19 PROCEDURE — 74011250636 HC RX REV CODE- 250/636: Performed by: EMERGENCY MEDICINE

## 2022-12-19 PROCEDURE — 85027 COMPLETE CBC AUTOMATED: CPT

## 2022-12-19 PROCEDURE — 83690 ASSAY OF LIPASE: CPT

## 2022-12-19 PROCEDURE — 74011250637 HC RX REV CODE- 250/637: Performed by: EMERGENCY MEDICINE

## 2022-12-19 RX ORDER — POLYETHYLENE GLYCOL 3350 17 G/17G
17 POWDER, FOR SOLUTION ORAL DAILY
Qty: 119 G | Refills: 0 | Status: SHIPPED | OUTPATIENT
Start: 2022-12-19 | End: 2022-12-26

## 2022-12-19 RX ORDER — KETOROLAC TROMETHAMINE 30 MG/ML
15 INJECTION, SOLUTION INTRAMUSCULAR; INTRAVENOUS
Status: COMPLETED | OUTPATIENT
Start: 2022-12-19 | End: 2022-12-19

## 2022-12-19 RX ORDER — FLUCONAZOLE 100 MG/1
200 TABLET ORAL
Status: COMPLETED | OUTPATIENT
Start: 2022-12-19 | End: 2022-12-19

## 2022-12-19 RX ORDER — ACETAMINOPHEN 500 MG
1000 TABLET ORAL 3 TIMES DAILY
Qty: 24 TABLET | Refills: 0 | Status: SHIPPED | OUTPATIENT
Start: 2022-12-19 | End: 2022-12-23

## 2022-12-19 RX ORDER — IBUPROFEN 600 MG/1
600 TABLET ORAL
Qty: 30 TABLET | Refills: 0 | Status: SHIPPED | OUTPATIENT
Start: 2022-12-19

## 2022-12-19 RX ORDER — FLUCONAZOLE 100 MG/1
200 TABLET ORAL DAILY
Status: DISCONTINUED | OUTPATIENT
Start: 2022-12-20 | End: 2022-12-19

## 2022-12-19 RX ADMIN — KETOROLAC TROMETHAMINE 15 MG: 30 INJECTION, SOLUTION INTRAMUSCULAR; INTRAVENOUS at 19:53

## 2022-12-19 RX ADMIN — FLUCONAZOLE 200 MG: 100 TABLET ORAL at 20:08

## 2022-12-19 NOTE — ED TRIAGE NOTES
Patient here with complaint of lower abdominal pain and back pain since last week, reports frequent urination and vaginal itching as well.

## 2022-12-20 NOTE — ED PROVIDER NOTES
72-year-old female presenting ER with report of some suprapubic abdominal discomfort and back pain for the last week. Reports pain with urination as well as vaginal itching. Denies any blood in the urine. Denies any rash or lesions. Patient denies any blood in the urine. No reported any fevers or chills. No nausea or vomiting. No reported any diarrhea or constipation. Has not taken any medications for her symptoms. Past Medical History:   Diagnosis Date    Arthritis     RA    Atypical ductal hyperplasia of right breast     Autoimmune disease (HCC)     RA    Hypercholesterolemia     on meds    Hyperlipidemia     Hypertension     on meds     Ill-defined disease     lumbosacral pain d/t degenerative change in lumbar spine    Type 2 diabetes mellitus (HCC)        Past Surgical History:   Procedure Laterality Date    HX BREAST BIOPSY Right 2022    RIGHT BREAST EXCISIONAL BIOPSY WITH ULTRASOUND performed by Silvia Simon MD at [place] [address]    HX CATARACT REMOVAL Bilateral     HX  SECTION      HX GI      COLONOSCOPY    HX HYSTERECTOMY  2007    [facility]    HX PELVIC LAPAROSCOPY  3/20/08    for pelvic pain and findings were negative    HX POLYPECTOMY      SC BIOPSY OF BREAST, NEEDLE CORE Right          Family History:   Problem Relation Age of Onset    Cancer Father         throat cancer    Diabetes Father     Diabetes Sister         diabetic coma    Breast Cancer Paternal Aunt         Paternal aunt in her 63's when diagnosed    Anesth Problems Neg Hx        Social History     Socioeconomic History    Marital status: SINGLE     Spouse name: Not on file    Number of children: Not on file    Years of education: Not on file    Highest education level: Not on file   Occupational History    Not on file   Tobacco Use    Smoking status: Never    Smokeless tobacco: Never   Vaping Use    Vaping Use: Never used   Substance and Sexual Activity    Alcohol use:  Yes     Alcohol/week: 0.0 standard drinks     Comment: occasionally    Drug use: No    Sexual activity: Yes     Partners: Male     Birth control/protection: Surgical   Other Topics Concern    Not on file   Social History Narrative    Not on file     Social Determinants of Health     Financial Resource Strain: Not on file   Food Insecurity: Not on file   Transportation Needs: Not on file   Physical Activity: Not on file   Stress: Not on file   Social Connections: Not on file   Intimate Partner Violence: Not on file   Housing Stability: Not on file         ALLERGIES: Patient has no known allergies. Review of Systems   Constitutional:  Negative for chills and fever. HENT:  Negative for congestion and sore throat. Eyes:  Negative for pain. Respiratory:  Negative for shortness of breath. Cardiovascular:  Negative for chest pain. Gastrointestinal:  Positive for abdominal pain. Negative for diarrhea, nausea and vomiting. Genitourinary:  Positive for dysuria and vaginal pain (itchiness). Negative for flank pain. Musculoskeletal:  Negative for back pain and neck pain. Skin:  Negative for rash. Neurological:  Negative for dizziness and headaches. All other systems reviewed and are negative. Vitals:    12/19/22 1714   BP: (!) 141/73   Pulse: 98   Resp: 16   Temp: 98.8 °F (37.1 °C)   SpO2: 99%   Weight: 80.3 kg (177 lb)   Height: 5' 1\" (1.549 m)            Physical Exam  Vitals and nursing note reviewed. Constitutional:       Appearance: She is well-developed. HENT:      Head: Normocephalic. Eyes:      Conjunctiva/sclera: Conjunctivae normal.   Cardiovascular:      Rate and Rhythm: Normal rate and regular rhythm. Pulmonary:      Effort: Pulmonary effort is normal. No respiratory distress. Breath sounds: Normal breath sounds. Abdominal:      General: Bowel sounds are normal.      Palpations: Abdomen is soft. Tenderness: There is no abdominal tenderness.  There is no right CVA tenderness, left CVA tenderness, guarding or rebound. Negative signs include Cristina's sign and McBurney's sign. Musculoskeletal:         General: Normal range of motion. Cervical back: Normal range of motion and neck supple. Skin:     General: Skin is warm. Capillary Refill: Capillary refill takes less than 2 seconds. Findings: No rash. Neurological:      Mental Status: She is alert and oriented to person, place, and time. Comments: No gross motor or sensory deficits        MDM  Number of Diagnoses or Management Options  Abdominal pain, suprapubic  Constipation, unspecified constipation type  Type 2 diabetes mellitus with hyperglycemia, with long-term current use of insulin (HCC)  Vaginal candidiasis  Vaginal itching  Diagnosis management comments: Patient with some abdominal pain that has been present for the last week. Reports mild constipation but also reporting dysuria and vaginal itchiness. Urine and no signs of infection. Patient had elevated glucose which she takes insulin for. No signs of DKA on lab work. No leukocytosis no lab or electrolyte abnormalities. Patient has nonacute abdomen on exam.  No reported any lesions. Patient deferred pelvic exam.  Will treat for yeast infection. Discussed treatment for constipation    Discussed the discharge impression and any labs and the results with the patient. Answered any questions and addressed any concerns. Discussed the importance of following up with their primary care provider and/or specialist.  Discussed signs or symptoms that would warrant return back to the ER for further evaluation. The patient is agreeable with discharge.          Amount and/or Complexity of Data Reviewed  Clinical lab tests: reviewed  Tests in the radiology section of CPT®: reviewed           Procedures               Recent Results (from the past 24 hour(s))   URINALYSIS W/ REFLEX CULTURE    Collection Time: 12/19/22  5:22 PM    Specimen: Urine   Result Value Ref Range    Color YELLOW/STRAW Appearance CLEAR CLEAR      Specific gravity 1.010 1.003 - 1.030      pH (UA) 5.5 5.0 - 8.0      Protein Negative NEG mg/dL    Glucose >1,000 (A) NEG mg/dL    Ketone Negative NEG mg/dL    Bilirubin Negative NEG      Blood Negative NEG      Urobilinogen 0.2 0.2 - 1.0 EU/dL    Nitrites Negative NEG      Leukocyte Esterase Negative NEG      WBC 0-4 0 - 4 /hpf    RBC 0-5 /hpf    Epithelial cells FEW FEW /lpf    Bacteria Negative NEG /hpf    UA:UC IF INDICATED CULTURE NOT INDICATED BY UA RESULT     METABOLIC PANEL, COMPREHENSIVE    Collection Time: 12/19/22  6:23 PM   Result Value Ref Range    Sodium 144 136 - 145 mmol/L    Potassium 3.7 3.5 - 5.1 mmol/L    Chloride 107 98 - 107 mmol/L    CO2 24 22 - 29 mmol/L    Anion gap 13 5 - 15 mmol/L    Glucose 249 (H) 65 - 100 mg/dL    BUN 19 8 - 23 MG/DL    Creatinine 0.54 0.50 - 0.90 MG/DL    BUN/Creatinine ratio 35 (H) 12 - 20      eGFR >60 >60 ml/min/1.73m2    Calcium 9.5 8.8 - 10.2 MG/DL    Bilirubin, total 0.3 0.2 - 1.0 MG/DL    ALT (SGPT) 28 10 - 35 U/L    AST (SGOT) 33 10 - 35 U/L    Alk.  phosphatase 90 35 - 104 U/L    Protein, total 6.8 6.4 - 8.3 g/dL    Albumin 3.7 3.5 - 5.2 g/dL    Globulin 3.1 2.0 - 4.0 g/dL    A-G Ratio 1.2 1.1 - 2.2     LIPASE    Collection Time: 12/19/22  6:23 PM   Result Value Ref Range    Lipase 56 13 - 60 U/L   CBC W/O DIFF    Collection Time: 12/19/22  7:03 PM   Result Value Ref Range    WBC 7.2 3.6 - 11.0 K/uL    RBC 3.92 3.80 - 5.20 M/uL    HGB 12.2 11.5 - 16.0 g/dL    HCT 38.1 35.0 - 47.0 %    MCV 97.2 80.0 - 99.0 FL    MCH 31.1 26.0 - 34.0 PG    MCHC 32.0 30.0 - 36.5 g/dL    RDW 14.3 11.5 - 14.5 %    PLATELET 626 770 - 381 K/uL    MPV 9.2 8.9 - 12.9 FL    NRBC 0.0 0  WBC    ABSOLUTE NRBC 0.00 0.00 - 0.01 K/uL   DIFFERENTIAL, AUTO    Collection Time: 12/19/22  7:03 PM   Result Value Ref Range    NEUTROPHILS 59 32 - 75 %    LYMPHOCYTES 31 12 - 49 %    MONOCYTES 9 5 - 13 %    EOSINOPHILS 1 0 - 7 %    BASOPHILS 0 0 - 1 %    IMMATURE GRANULOCYTES 0 0.0 - 0.5 %    ABS. NEUTROPHILS 4.3 1.8 - 8.0 K/UL    ABS. LYMPHOCYTES 2.2 0.8 - 3.5 K/UL    ABS. MONOCYTES 0.6 0.0 - 1.0 K/UL    ABS. EOSINOPHILS 0.1 0.0 - 0.4 K/UL    ABS. BASOPHILS 0.0 0.0 - 0.1 K/UL    ABS. IMM. GRANS. 0.0 0.00 - 0.04 K/UL    DF AUTOMATED         XR ABD (KUB)    Result Date: 12/19/2022  EXAM: XR ABD (KUB) INDICATION: abd pain, constipation COMPARISON: CT 8/29/2018. FINDINGS: A supine radiograph of the abdomen shows a nodular to bowel gas pattern. A mild amount of stool is seen in the colon. . Cholelithiasis is present in the right upper quadrant. Estil Umaña Spondylosis is seen in the lumbar spine. . Electronic device overlies the left pelvis. No evidence of acute process.

## 2023-02-10 DIAGNOSIS — Z79.4 TYPE 2 DIABETES MELLITUS WITH HYPERGLYCEMIA, WITH LONG-TERM CURRENT USE OF INSULIN (HCC): ICD-10-CM

## 2023-02-10 DIAGNOSIS — E11.65 TYPE 2 DIABETES MELLITUS WITH HYPERGLYCEMIA, WITH LONG-TERM CURRENT USE OF INSULIN (HCC): ICD-10-CM

## 2023-05-07 DIAGNOSIS — E11.65 TYPE 2 DIABETES MELLITUS WITH HYPERGLYCEMIA (HCC): ICD-10-CM

## 2023-05-08 RX ORDER — BLOOD-GLUCOSE SENSOR
EACH MISCELLANEOUS
Qty: 6 EACH | Refills: 1 | Status: SHIPPED | OUTPATIENT
Start: 2023-05-08

## 2023-05-22 NOTE — PROGRESS NOTES
a1c 8.6, GFR > 60, Chol 170/201/52/84   11/21/21 GFR > 60, TSH 0.4, Vit D 34 (other labs too)    No results found for: NLI2DLWS    Lab Results   Component Value Date/Time    LABA1C 7.8 11/10/2022 04:46 PM    LABA1C 7.6 08/08/2022 04:21 PM    LABA1C 9.8 05/02/2022 04:12 PM    CREATININE 0.54 12/19/2022 06:23 PM    EGFR >60 12/19/2022 06:23 PM    [address] 01/31/2022 03:35 PM    MALBCR 20 01/31/2022 03:35 PM       Lab Results   Component Value Date/Time    CHOL 182 01/31/2022 03:35 PM    TRIG 95 01/31/2022 03:35 PM    HDL 71 01/31/2022 03:35 PM    LDLCALC 94 01/31/2022 03:35 PM       No results found for: TSH    No results found for: CPEPLT    Past Medical History:   Diagnosis Date    Arthritis     RA    Atypical ductal hyperplasia of right breast     Autoimmune disease ([facility] [address]     RA    Hypercholesterolemia     on meds    Hyperlipidemia     Hypertension     on meds     Ill-defined disease     lumbosacral pain d/t degenerative change in lumbar spine    Type 2 diabetes mellitus (HCC)           Blood pressure (!) 146/74, pulse 94, height 5' 1\" (1.549 m), weight 178 lb (80.7 kg). No flowsheet data found. EXAM  - not done today       Assessment/Plan:    1. Type 2 diabetes mellitus with hyperglycemia, with long-term current use of insulin (HCC)  Mounjaro not covered, did not resume ozempmic  Sugars high during day, drop during night    Add back ozempic 1.0mg - but do 1/4 # clicks one week, 8/1#C click for one week then 1.0mg  Written Rx for 2.0mg to fill after that    Lower insulin 10 units when start ozempic and continue to lower prn    Is on low dose steroid that can affect post-prandial #s, may have to consider meal insulin if max ozempic not helpful enough (also call if may need short acting insulin scale to bring sugars down when high)     No-showed for PDH      2. Primary hypertension   On ACE-I      3.  Mixed hyperlipidemia   At goal on statin       Orders Placed This Encounter   Medications    OZEMPIC, 1

## 2023-05-24 ENCOUNTER — OFFICE VISIT (OUTPATIENT)
Age: 63
End: 2023-05-24
Payer: COMMERCIAL

## 2023-05-24 VITALS
SYSTOLIC BLOOD PRESSURE: 146 MMHG | DIASTOLIC BLOOD PRESSURE: 74 MMHG | BODY MASS INDEX: 33.61 KG/M2 | HEIGHT: 61 IN | WEIGHT: 178 LBS | HEART RATE: 94 BPM

## 2023-05-24 DIAGNOSIS — Z79.4 TYPE 2 DIABETES MELLITUS WITH HYPERGLYCEMIA, WITH LONG-TERM CURRENT USE OF INSULIN (HCC): Primary | ICD-10-CM

## 2023-05-24 DIAGNOSIS — E11.65 TYPE 2 DIABETES MELLITUS WITH HYPERGLYCEMIA, WITH LONG-TERM CURRENT USE OF INSULIN (HCC): Primary | ICD-10-CM

## 2023-05-24 DIAGNOSIS — I10 PRIMARY HYPERTENSION: ICD-10-CM

## 2023-05-24 DIAGNOSIS — E78.2 MIXED HYPERLIPIDEMIA: ICD-10-CM

## 2023-05-24 PROCEDURE — 3077F SYST BP >= 140 MM HG: CPT | Performed by: INTERNAL MEDICINE

## 2023-05-24 PROCEDURE — 99214 OFFICE O/P EST MOD 30 MIN: CPT | Performed by: INTERNAL MEDICINE

## 2023-05-24 PROCEDURE — 95251 CONT GLUC MNTR ANALYSIS I&R: CPT | Performed by: INTERNAL MEDICINE

## 2023-05-24 PROCEDURE — 3078F DIAST BP <80 MM HG: CPT | Performed by: INTERNAL MEDICINE

## 2023-05-24 RX ORDER — SEMAGLUTIDE 2.68 MG/ML
INJECTION, SOLUTION SUBCUTANEOUS
Qty: 3 ML | Refills: 5 | Status: SHIPPED | OUTPATIENT
Start: 2023-05-24

## 2023-05-24 RX ORDER — ADALIMUMAB 40MG/0.4ML
KIT SUBCUTANEOUS
COMMUNITY
Start: 2023-05-19

## 2023-05-24 RX ORDER — SEMAGLUTIDE 1.34 MG/ML
INJECTION, SOLUTION SUBCUTANEOUS
Qty: 3 ML | Refills: 5 | Status: SHIPPED | OUTPATIENT
Start: 2023-05-24

## 2023-05-24 RX ORDER — PREDNISONE 2.5 MG
TABLET ORAL
COMMUNITY
Start: 2023-04-24

## 2023-05-24 RX ORDER — LEFLUNOMIDE 20 MG/1
20 TABLET ORAL DAILY
COMMUNITY
Start: 2023-04-24

## 2023-05-25 LAB
ALBUMIN/CREAT UR: 59 MG/G CREAT (ref 0–29)
BUN SERPL-MCNC: 17 MG/DL (ref 8–27)
BUN/CREAT SERPL: 38 (ref 12–28)
CALCIUM SERPL-MCNC: 9.7 MG/DL (ref 8.7–10.3)
CHLORIDE SERPL-SCNC: 104 MMOL/L (ref 96–106)
CO2 SERPL-SCNC: 21 MMOL/L (ref 20–29)
CREAT SERPL-MCNC: 0.45 MG/DL (ref 0.57–1)
CREAT UR-MCNC: 36.4 MG/DL
EGFRCR SERPLBLD CKD-EPI 2021: 109 ML/MIN/1.73
GLUCOSE SERPL-MCNC: 189 MG/DL (ref 70–99)
HBA1C MFR BLD: 9.4 % (ref 4.8–5.6)
MICROALBUMIN UR-MCNC: 21.5 UG/ML
POTASSIUM SERPL-SCNC: 4.3 MMOL/L (ref 3.5–5.2)
SODIUM SERPL-SCNC: 141 MMOL/L (ref 134–144)

## 2023-05-30 ENCOUNTER — CLINICAL DOCUMENTATION (OUTPATIENT)
Age: 63
End: 2023-05-30

## 2023-06-22 ENCOUNTER — TRANSCRIBE ORDERS (OUTPATIENT)
Facility: HOSPITAL | Age: 63
End: 2023-06-22

## 2023-06-22 ENCOUNTER — OFFICE VISIT (OUTPATIENT)
Age: 63
End: 2023-06-22
Payer: COMMERCIAL

## 2023-06-22 VITALS — HEIGHT: 61 IN | BODY MASS INDEX: 33.23 KG/M2 | WEIGHT: 176 LBS

## 2023-06-22 DIAGNOSIS — Z91.89 AT HIGH RISK FOR BREAST CANCER: ICD-10-CM

## 2023-06-22 DIAGNOSIS — N60.12 FIBROCYSTIC BREAST CHANGES OF BOTH BREASTS: Primary | ICD-10-CM

## 2023-06-22 DIAGNOSIS — Z12.31 ENCOUNTER FOR SCREENING MAMMOGRAM FOR BREAST CANCER: ICD-10-CM

## 2023-06-22 DIAGNOSIS — N60.11 FIBROCYSTIC BREAST CHANGES OF BOTH BREASTS: Primary | ICD-10-CM

## 2023-06-22 DIAGNOSIS — Z12.31 VISIT FOR SCREENING MAMMOGRAM: Primary | ICD-10-CM

## 2023-06-22 PROCEDURE — 99213 OFFICE O/P EST LOW 20 MIN: CPT | Performed by: NURSE PRACTITIONER

## 2023-06-22 NOTE — PROGRESS NOTES
HISTORY OF PRESENT ILLNESS  Cecilio Crisostomo is a 58 y.o. female     HPI Established patient presents for follow-up as a high risk patient due to her personal history of ADH. Denies breast mass, skin changes, nipple discharge and pain. Breast history -    Referring - Louis Olmos    STbx - 4/21/22 -  ATYPICAL DUCTAL HYPERPLASIA   6/7/22 - RIGHT breast excisional biopsy - Dr. José Antonio Weiss   1. Right breast, excisional biopsy:   Focal ductal hyperplasia adjacent to previous biopsy site (see Comment). Flat epithelial atypia. Microcalcifications present within benign ducts. 2.  Right breast excisional  biopsy, anterior margin:   Flat epithelial atypia. Biopsy site changes. Microcalcifications present within benign ducts. Family history -    Maternal aunt had cancer not sure what type. Father had throat cancer. Past Surgical History:   Procedure Laterality Date    BIOPSY OF BREAST, NEEDLE CORE Right     BREAST BIOPSY Right 6/7/2022    RIGHT BREAST EXCISIONAL BIOPSY WITH ULTRASOUND performed by Juan Miguel Leary MD at [address] Bilateral     [place] (CERVIX STATUS UNKNOWN)  8/2007    [facility]    LENA STEROTACTIC LOC BREAST BIOPSY RIGHT Right 4/21/2022    LENA STEROTACTIC LOC BREAST BIOPSY RIGHT 4/21/2022 [facility] RAD MAMMO    PELVIC LAPAROSCOPY  3/20/08    for pelvic pain and findings were negative    POLYPECTOMY               Review of Systems      Physical Exam  Constitutional:       Appearance: Normal appearance. Chest:   Breasts:     Right: No mass, nipple discharge, skin change or tenderness. Left: No nipple discharge, skin change or tenderness. Musculoskeletal:      Comments: FROM - UE x 2   Lymphadenopathy:      Upper Body:      Right upper body: No supraclavicular or axillary adenopathy. Left upper body: No supraclavicular or axillary adenopathy.    Neurological:      Mental Status: She is

## 2023-08-28 ENCOUNTER — OFFICE VISIT (OUTPATIENT)
Age: 63
End: 2023-08-28
Payer: COMMERCIAL

## 2023-08-28 VITALS
HEART RATE: 109 BPM | BODY MASS INDEX: 32.66 KG/M2 | DIASTOLIC BLOOD PRESSURE: 74 MMHG | SYSTOLIC BLOOD PRESSURE: 144 MMHG | HEIGHT: 61 IN | WEIGHT: 173 LBS

## 2023-08-28 DIAGNOSIS — E11.65 TYPE 2 DIABETES MELLITUS WITH HYPERGLYCEMIA, WITH LONG-TERM CURRENT USE OF INSULIN (HCC): Primary | ICD-10-CM

## 2023-08-28 DIAGNOSIS — Z79.4 TYPE 2 DIABETES MELLITUS WITH HYPERGLYCEMIA, WITH LONG-TERM CURRENT USE OF INSULIN (HCC): Primary | ICD-10-CM

## 2023-08-28 PROCEDURE — 3077F SYST BP >= 140 MM HG: CPT | Performed by: INTERNAL MEDICINE

## 2023-08-28 PROCEDURE — 3046F HEMOGLOBIN A1C LEVEL >9.0%: CPT | Performed by: INTERNAL MEDICINE

## 2023-08-28 PROCEDURE — 99214 OFFICE O/P EST MOD 30 MIN: CPT | Performed by: INTERNAL MEDICINE

## 2023-08-28 PROCEDURE — 3078F DIAST BP <80 MM HG: CPT | Performed by: INTERNAL MEDICINE

## 2023-08-28 RX ORDER — PROCHLORPERAZINE 25 MG/1
SUPPOSITORY RECTAL
COMMUNITY
Start: 2023-06-07

## 2023-08-28 NOTE — PROGRESS NOTES
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 [employer]     This week was last week on prednisone (was on for RA)  Had a couple bouts of dehydration    Was a no-show for PDH     A1c: last a1c was 9.4    DM Medications:     Toujeo max 60 AM  Glipizide 5mg 2 BID AC (not missing 2nd dose now)  Jardiance 25mg  Ozempic 2.0gm once a week     Last Changes: :ozempic to moujaro but not covered and did not restart ozempic, she increased toujeo on her own    Sugar Checks: checks more than 4 times a day and uses [employer] CGM     AM: reports:  125-140 (no Bk), by lunch 130-140s, by dinner ok   When dehydrated, hungry already up to 200 - had not eaten -back to normal now    PM: reports:  see above     LOWs:  has low sugars     DIET: is working on it, feels does well with diabetic diet - insurance has someone calling and helping    EXERCISE: is trying to add exercise - joined gym     HTN: on ACE-I     LIPIDS: on statin, lipids followed by PCP    RENAL: has normal renal function, has normal TARAS-r , in the past year, is on an ACE-I     EYES: overdue for eye exam     DENTAL:  has seen dentist in past year     FEET: has no current issues, no numbness or tingling     HEART:  no chest pain, shortness of breath or claudication, has no cardiac history     ASA:  does not take aspirin or other blood thinner     SYMPTOMS: no polyuria, thirst or blurred vision     THYROID: no known thyroid issue    DIABETES HISTORY:    From initial visit at [facility]:  Diagnosed '07   Started off on metformin and glyburide   was on avandia but stopped b/c heart risks (no wt gain or swelling)   Insulin started 6m -1 yr ago   Was on januvia in past - she just stopped taking (b/c heard could cause cancer0   No starlix or prandin   Only 70/30 for insulin type   No GLP1   Working out in past did not help sugars      LABS/STUDIES:   7/30/21

## 2023-08-28 NOTE — PATIENT INSTRUCTIONS
Within the [employer], to accept a sharing invitation from your clinic:    1. Tap Profile. 2. Tap Authorize Sharing. 3. Tap Accept Invitation. 4. Enter the Sharing Code provided by your clinic. 5. Select your Date of Birth. 6. Tap Continue. 7. Tap the I consent to share my data with my clinic box. 8. Tap Yes, Share My Data.

## 2023-08-29 LAB — HBA1C MFR BLD: 7.5 % (ref 4.8–5.6)

## 2023-09-18 ENCOUNTER — HOSPITAL ENCOUNTER (OUTPATIENT)
Facility: HOSPITAL | Age: 63
Discharge: HOME | End: 2023-09-21
Payer: COMMERCIAL

## 2023-09-18 VITALS — BODY MASS INDEX: 32.47 KG/M2 | HEIGHT: 61 IN | WEIGHT: 172 LBS

## 2023-09-18 DIAGNOSIS — Z12.31 VISIT FOR SCREENING MAMMOGRAM: ICD-10-CM

## 2023-09-18 PROCEDURE — 77067 SCR MAMMO BI INCL CAD: CPT

## 2023-09-24 DIAGNOSIS — E11.65 TYPE 2 DIABETES MELLITUS WITH HYPERGLYCEMIA (HCC): ICD-10-CM

## 2023-09-24 RX ORDER — INSULIN GLARGINE 300 U/ML
INJECTION, SOLUTION SUBCUTANEOUS
Qty: 24 ML | Refills: 1 | Status: SHIPPED | OUTPATIENT
Start: 2023-09-24

## 2023-09-24 RX ORDER — EMPAGLIFLOZIN 25 MG/1
25 TABLET, FILM COATED ORAL DAILY
Qty: 90 TABLET | Refills: 3 | Status: SHIPPED | OUTPATIENT
Start: 2023-09-24

## 2023-10-10 ENCOUNTER — CLINICAL DOCUMENTATION (OUTPATIENT)
Age: 63
End: 2023-10-10

## 2023-10-10 RX ORDER — ATORVASTATIN CALCIUM 10 MG/1
TABLET, FILM COATED ORAL
Qty: 90 TABLET | Refills: 2 | Status: SHIPPED | OUTPATIENT
Start: 2023-10-10

## 2023-10-10 RX ORDER — AMLODIPINE BESYLATE 5 MG/1
5 TABLET ORAL DAILY
Qty: 90 TABLET | Refills: 2 | OUTPATIENT
Start: 2023-10-10

## 2023-10-11 ENCOUNTER — CLINICAL DOCUMENTATION (OUTPATIENT)
Age: 63
End: 2023-10-11

## 2023-10-11 RX ORDER — ATORVASTATIN CALCIUM 20 MG/1
TABLET, FILM COATED ORAL
COMMUNITY
Start: 2023-10-03

## 2023-10-11 RX ORDER — PEN NEEDLE, DIABETIC 32GX 5/32"
1 NEEDLE, DISPOSABLE MISCELLANEOUS DAILY
Qty: 100 EACH | Refills: 3 | Status: SHIPPED | OUTPATIENT
Start: 2023-10-11 | End: 2023-10-13 | Stop reason: SDUPTHER

## 2023-10-13 RX ORDER — PEN NEEDLE, DIABETIC 32GX 5/32"
1 NEEDLE, DISPOSABLE MISCELLANEOUS DAILY
Qty: 100 EACH | Refills: 3 | Status: SHIPPED | OUTPATIENT
Start: 2023-10-13

## 2023-10-17 DIAGNOSIS — E11.65 TYPE 2 DIABETES MELLITUS WITH HYPERGLYCEMIA (HCC): ICD-10-CM

## 2023-10-17 RX ORDER — INSULIN GLARGINE 300 U/ML
INJECTION, SOLUTION SUBCUTANEOUS
Qty: 24 ML | Refills: 3 | Status: SHIPPED | OUTPATIENT
Start: 2023-10-17

## 2023-10-17 RX ORDER — AMLODIPINE BESYLATE 5 MG/1
5 TABLET ORAL DAILY
Qty: 90 TABLET | Refills: 3 | OUTPATIENT
Start: 2023-10-17

## 2023-10-20 ENCOUNTER — CLINICAL DOCUMENTATION (OUTPATIENT)
Age: 63
End: 2023-10-20

## 2023-10-30 DIAGNOSIS — E11.65 TYPE 2 DIABETES MELLITUS WITH HYPERGLYCEMIA, WITH LONG-TERM CURRENT USE OF INSULIN (HCC): Primary | ICD-10-CM

## 2023-10-30 DIAGNOSIS — Z79.4 TYPE 2 DIABETES MELLITUS WITH HYPERGLYCEMIA, WITH LONG-TERM CURRENT USE OF INSULIN (HCC): Primary | ICD-10-CM

## 2023-10-30 RX ORDER — PROCHLORPERAZINE 25 MG/1
SUPPOSITORY RECTAL
Qty: 9 EACH | Refills: 1 | Status: SHIPPED | OUTPATIENT
Start: 2023-10-30

## 2023-11-07 RX ORDER — INSULIN GLARGINE 300 U/ML
INJECTION, SOLUTION SUBCUTANEOUS
Qty: 24 ML | Refills: 3 | Status: SHIPPED | OUTPATIENT
Start: 2023-11-07

## 2023-12-12 DIAGNOSIS — E11.65 TYPE 2 DIABETES MELLITUS WITH HYPERGLYCEMIA (HCC): ICD-10-CM

## 2023-12-12 RX ORDER — GLIPIZIDE 5 MG/1
10 TABLET ORAL 2 TIMES DAILY
Qty: 360 TABLET | Refills: 3 | Status: SHIPPED | OUTPATIENT
Start: 2023-12-12

## 2024-01-03 DIAGNOSIS — Z79.4 TYPE 2 DIABETES MELLITUS WITH HYPERGLYCEMIA, WITH LONG-TERM CURRENT USE OF INSULIN (HCC): ICD-10-CM

## 2024-01-03 DIAGNOSIS — E11.65 TYPE 2 DIABETES MELLITUS WITH HYPERGLYCEMIA, WITH LONG-TERM CURRENT USE OF INSULIN (HCC): ICD-10-CM

## 2024-01-04 RX ORDER — SEMAGLUTIDE 2.68 MG/ML
INJECTION, SOLUTION SUBCUTANEOUS
Qty: 3 ML | Refills: 5 | Status: SHIPPED | OUTPATIENT
Start: 2024-01-04

## 2024-01-04 RX ORDER — PROCHLORPERAZINE 25 MG/1
SUPPOSITORY RECTAL
Qty: 9 EACH | Refills: 1 | Status: SHIPPED | OUTPATIENT
Start: 2024-01-04

## 2024-01-25 ENCOUNTER — OFFICE VISIT (OUTPATIENT)
Age: 64
End: 2024-01-25
Payer: COMMERCIAL

## 2024-01-25 VITALS
DIASTOLIC BLOOD PRESSURE: 82 MMHG | WEIGHT: 175 LBS | HEART RATE: 97 BPM | BODY MASS INDEX: 33.04 KG/M2 | HEIGHT: 61 IN | SYSTOLIC BLOOD PRESSURE: 152 MMHG

## 2024-01-25 DIAGNOSIS — Z79.4 TYPE 2 DIABETES MELLITUS WITH HYPERGLYCEMIA, WITH LONG-TERM CURRENT USE OF INSULIN (HCC): Primary | ICD-10-CM

## 2024-01-25 DIAGNOSIS — E11.65 TYPE 2 DIABETES MELLITUS WITH HYPERGLYCEMIA, WITH LONG-TERM CURRENT USE OF INSULIN (HCC): Primary | ICD-10-CM

## 2024-01-25 DIAGNOSIS — I10 PRIMARY HYPERTENSION: ICD-10-CM

## 2024-01-25 DIAGNOSIS — E78.2 MIXED HYPERLIPIDEMIA: ICD-10-CM

## 2024-01-25 PROCEDURE — 3077F SYST BP >= 140 MM HG: CPT | Performed by: INTERNAL MEDICINE

## 2024-01-25 PROCEDURE — 3079F DIAST BP 80-89 MM HG: CPT | Performed by: INTERNAL MEDICINE

## 2024-01-25 PROCEDURE — 99214 OFFICE O/P EST MOD 30 MIN: CPT | Performed by: INTERNAL MEDICINE

## 2024-01-25 RX ORDER — ADALIMUMAB-ADAZ 40 MG/.4ML
INJECTION, SOLUTION SUBCUTANEOUS
COMMUNITY
Start: 2023-12-18

## 2024-01-25 RX ORDER — LISINOPRIL 40 MG/1
TABLET ORAL
Qty: 90 TABLET | Refills: 1 | Status: SHIPPED | OUTPATIENT
Start: 2024-01-25

## 2024-01-26 LAB
BUN SERPL-MCNC: 15 MG/DL (ref 8–27)
BUN/CREAT SERPL: 41 (ref 12–28)
CALCIUM SERPL-MCNC: 9.3 MG/DL (ref 8.7–10.3)
CHLORIDE SERPL-SCNC: 106 MMOL/L (ref 96–106)
CO2 SERPL-SCNC: 20 MMOL/L (ref 20–29)
CREAT SERPL-MCNC: 0.37 MG/DL (ref 0.57–1)
EGFRCR SERPLBLD CKD-EPI 2021: 113 ML/MIN/1.73
GLUCOSE SERPL-MCNC: 149 MG/DL (ref 70–99)
HBA1C MFR BLD: 8.2 % (ref 4.8–5.6)
POTASSIUM SERPL-SCNC: 3.9 MMOL/L (ref 3.5–5.2)
SODIUM SERPL-SCNC: 141 MMOL/L (ref 134–144)

## 2024-01-27 LAB
ALBUMIN/CREAT UR: 66 MG/G CREAT (ref 0–29)
CREAT UR-MCNC: 92 MG/DL
MICROALBUMIN UR-MCNC: 60.9 UG/ML

## 2024-01-29 DIAGNOSIS — E11.65 TYPE 2 DIABETES MELLITUS WITH HYPERGLYCEMIA, WITH LONG-TERM CURRENT USE OF INSULIN (HCC): ICD-10-CM

## 2024-01-29 DIAGNOSIS — Z79.4 TYPE 2 DIABETES MELLITUS WITH HYPERGLYCEMIA, WITH LONG-TERM CURRENT USE OF INSULIN (HCC): ICD-10-CM

## 2024-01-30 DIAGNOSIS — E11.65 TYPE 2 DIABETES MELLITUS WITH HYPERGLYCEMIA (HCC): ICD-10-CM

## 2024-01-30 RX ORDER — PROCHLORPERAZINE 25 MG/1
SUPPOSITORY RECTAL
Qty: 9 EACH | Refills: 1 | Status: SHIPPED | OUTPATIENT
Start: 2024-01-30

## 2024-01-31 RX ORDER — GLIPIZIDE 5 MG/1
10 TABLET ORAL 2 TIMES DAILY
Qty: 120 TABLET | Refills: 11 | Status: SHIPPED | OUTPATIENT
Start: 2024-01-31

## 2024-04-25 DIAGNOSIS — E11.65 TYPE 2 DIABETES MELLITUS WITH HYPERGLYCEMIA, WITH LONG-TERM CURRENT USE OF INSULIN (HCC): ICD-10-CM

## 2024-04-25 DIAGNOSIS — Z79.4 TYPE 2 DIABETES MELLITUS WITH HYPERGLYCEMIA, WITH LONG-TERM CURRENT USE OF INSULIN (HCC): ICD-10-CM

## 2024-05-01 LAB — HBA1C MFR BLD: 6.4 % (ref 4.8–5.6)

## 2024-05-29 NOTE — PROGRESS NOTES
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   Back on meds - has insurance issues last time and without for shor time  Asks about BLAS and wt gain - advised not common or much, other option for her would be metformin but did not change today (can readdress in future)    Was a no-show for PDH in past    A1c: last a1c was 6.4  4/2024    DM Medications:    Toujeo max 64 AM  Glipizide 5mg 2 BID AC (not missing 2nd dose now)  Jardiance 25mg  Ozempic 2.0gm once a week     Last Changes: :none    Sugar Checks: checks more than 4 times a day and uses Dexcom CGM needs transmitter for G6 (has 3 sensors left at home)    AM: reports:  can be in 60s  PM: reports:  < 180      LOWs:  denies low sugars     DIET: is working on it, feels does well with diabetic diet - weight is down!    EXERCISE: is trying to add exercise -has gym membership    HTN: on ACE-I, on Ca-Blk, HTN followed by PCP     LIPIDS: on statin, lipids followed by PCP    RENAL: has normal renal function, has normal TARAS-r , in the past year, is on an ACE-I     EYES: eye exam in past year, has no retinopathy     DENTAL:  has seen dentist in past year     FEET: has no current issues, no numbness or tingling     HEART:  no chest pain, shortness of breath or claudication, has no cardiac history     ASA:  does not take aspirin or other blood thinner     SYMPTOMS: no polyuria, thirst or blurred vision     THYROID: no known thyroid issue    DIABETES HISTORY:    From initial visit at [facility]:  Diagnosed '07   Started off on metformin and glyburide   was on avandia but stopped b/c heart risks (no wt gain or swelling)   Insulin started 6m -1 yr ago   Was on januvia in past - she just stopped taking (b/c heard could cause cancer0   No starlix or prandin   Only 70/30 for insulin type   No GLP1   Working out in past did not help

## 2024-05-30 ENCOUNTER — OFFICE VISIT (OUTPATIENT)
Age: 64
End: 2024-05-30
Payer: COMMERCIAL

## 2024-05-30 ENCOUNTER — TRANSCRIBE ORDERS (OUTPATIENT)
Facility: HOSPITAL | Age: 64
End: 2024-05-30

## 2024-05-30 VITALS
HEIGHT: 61 IN | DIASTOLIC BLOOD PRESSURE: 77 MMHG | WEIGHT: 169 LBS | SYSTOLIC BLOOD PRESSURE: 157 MMHG | BODY MASS INDEX: 31.91 KG/M2 | HEART RATE: 88 BPM

## 2024-05-30 DIAGNOSIS — E11.65 TYPE 2 DIABETES MELLITUS WITH HYPERGLYCEMIA, WITH LONG-TERM CURRENT USE OF INSULIN (HCC): Primary | ICD-10-CM

## 2024-05-30 DIAGNOSIS — E78.2 MIXED HYPERLIPIDEMIA: ICD-10-CM

## 2024-05-30 DIAGNOSIS — I10 PRIMARY HYPERTENSION: ICD-10-CM

## 2024-05-30 DIAGNOSIS — Z79.4 TYPE 2 DIABETES MELLITUS WITH HYPERGLYCEMIA, WITH LONG-TERM CURRENT USE OF INSULIN (HCC): Primary | ICD-10-CM

## 2024-05-30 DIAGNOSIS — Z12.31 VISIT FOR SCREENING MAMMOGRAM: Primary | ICD-10-CM

## 2024-05-30 PROCEDURE — 3078F DIAST BP <80 MM HG: CPT | Performed by: INTERNAL MEDICINE

## 2024-05-30 PROCEDURE — 3077F SYST BP >= 140 MM HG: CPT | Performed by: INTERNAL MEDICINE

## 2024-05-30 PROCEDURE — 99214 OFFICE O/P EST MOD 30 MIN: CPT | Performed by: INTERNAL MEDICINE

## 2024-05-30 PROCEDURE — 3044F HG A1C LEVEL LT 7.0%: CPT | Performed by: INTERNAL MEDICINE

## 2024-05-30 RX ORDER — ADALIMUMAB 40MG/0.4ML
KIT SUBCUTANEOUS
COMMUNITY
Start: 2024-05-21

## 2024-05-30 RX ORDER — ACYCLOVIR 400 MG/1
TABLET ORAL
Qty: 3 EACH | Refills: 5 | Status: SHIPPED | OUTPATIENT
Start: 2024-05-30

## 2024-06-17 DIAGNOSIS — E11.65 TYPE 2 DIABETES MELLITUS WITH HYPERGLYCEMIA, WITH LONG-TERM CURRENT USE OF INSULIN (HCC): Primary | ICD-10-CM

## 2024-06-17 DIAGNOSIS — Z79.4 TYPE 2 DIABETES MELLITUS WITH HYPERGLYCEMIA, WITH LONG-TERM CURRENT USE OF INSULIN (HCC): Primary | ICD-10-CM

## 2024-06-17 RX ORDER — TIRZEPATIDE 2.5 MG/.5ML
INJECTION, SOLUTION SUBCUTANEOUS
Qty: 2 ML | Refills: 0 | Status: SHIPPED | OUTPATIENT
Start: 2024-06-17

## 2024-06-24 NOTE — PROGRESS NOTES
HISTORY OF PRESENT ILLNESS  Claire Briones is a 63 y.o. female     HPI  Established patient presents for follow-up as a high risk patient due to her personal history of ADH. Denies breast mass, skin changes, nipple discharge and pain.              Breast history -    Referring - Carolina Lewis    STbx - 22 -  ATYPICAL DUCTAL HYPERPLASIA   22 - RIGHT breast excisional biopsy - Dr. Morataya   1.  Right breast, excisional biopsy:   Focal ductal hyperplasia adjacent to previous biopsy site (see Comment).   Flat epithelial atypia.   Microcalcifications present within benign ducts.   2.  Right breast excisional  biopsy, anterior margin:   Flat epithelial atypia.   Biopsy site changes.   Microcalcifications present within benign ducts.               Family history -    Maternal aunt had cancer not sure what type.    Father had throat cancer.         OB History               Para        Term                AB        Living             SAB        IAB        Ectopic        Molar        Multiple        Live Births   2          Obstetric Comments   Menarche 16, LMP n/a, # of children 2, age of 1st delivery 1982, Hysterectomy/oophorectomy yes/yes, Breast bx yes/right/22, history of breast feeding no, BCP no, Hormone therapy no                   Past Surgical History:   Procedure Laterality Date    BIOPSY OF BREAST, NEEDLE CORE Right     BREAST BIOPSY Right 2022    RIGHT BREAST EXCISIONAL BIOPSY WITH ULTRASOUND performed by Catina Morataya MD at [facility] AMBULATORY OR    CATARACT REMOVAL Bilateral      SECTION      GI      COLONOSCOPY    HYSTERECTOMY (CERVIX STATUS UNKNOWN)  2007    [facility]    LENA STEROTACTIC LOC BREAST BIOPSY RIGHT Right 2022    LENA STEROTACTIC LOC BREAST BIOPSY RIGHT 2022 [facility] RAD MAMMO    PELVIC LAPAROSCOPY  3/20/08    for pelvic pain and findings were negative    POLYPECTOMY             Mammogram Result (most recent):  LENA SHARON DIGITAL SCREEN BILATERAL

## 2024-06-25 ENCOUNTER — OFFICE VISIT (OUTPATIENT)
Age: 64
End: 2024-06-25
Payer: COMMERCIAL

## 2024-06-25 VITALS — HEIGHT: 61 IN | WEIGHT: 169 LBS | BODY MASS INDEX: 31.91 KG/M2

## 2024-06-25 DIAGNOSIS — N60.12 FIBROCYSTIC BREAST CHANGES OF BOTH BREASTS: Primary | ICD-10-CM

## 2024-06-25 DIAGNOSIS — N60.11 FIBROCYSTIC BREAST CHANGES OF BOTH BREASTS: Primary | ICD-10-CM

## 2024-06-25 DIAGNOSIS — Z12.31 ENCOUNTER FOR SCREENING MAMMOGRAM FOR BREAST CANCER: ICD-10-CM

## 2024-06-25 DIAGNOSIS — Z91.89 AT HIGH RISK FOR BREAST CANCER: ICD-10-CM

## 2024-06-25 PROCEDURE — 99213 OFFICE O/P EST LOW 20 MIN: CPT | Performed by: NURSE PRACTITIONER

## 2024-07-16 DIAGNOSIS — I10 PRIMARY HYPERTENSION: ICD-10-CM

## 2024-07-16 RX ORDER — LISINOPRIL 40 MG/1
TABLET ORAL
Qty: 30 TABLET | Refills: 5 | Status: SHIPPED | OUTPATIENT
Start: 2024-07-16

## 2024-07-18 DIAGNOSIS — E11.65 TYPE 2 DIABETES MELLITUS WITH HYPERGLYCEMIA, WITH LONG-TERM CURRENT USE OF INSULIN (HCC): ICD-10-CM

## 2024-07-18 DIAGNOSIS — Z79.4 TYPE 2 DIABETES MELLITUS WITH HYPERGLYCEMIA, WITH LONG-TERM CURRENT USE OF INSULIN (HCC): ICD-10-CM

## 2024-07-18 RX ORDER — TIRZEPATIDE 2.5 MG/.5ML
INJECTION, SOLUTION SUBCUTANEOUS
Qty: 2 ML | Refills: 2 | Status: SHIPPED | OUTPATIENT
Start: 2024-07-18

## 2024-09-19 ENCOUNTER — HOSPITAL ENCOUNTER (OUTPATIENT)
Facility: HOSPITAL | Age: 64
Discharge: HOME | End: 2024-09-22
Payer: COMMERCIAL

## 2024-09-19 VITALS — BODY MASS INDEX: 32.1 KG/M2 | HEIGHT: 61 IN | WEIGHT: 170 LBS

## 2024-09-19 DIAGNOSIS — Z12.31 VISIT FOR SCREENING MAMMOGRAM: ICD-10-CM

## 2024-09-19 PROCEDURE — 77063 BREAST TOMOSYNTHESIS BI: CPT

## 2024-09-23 RX ORDER — TIRZEPATIDE 5 MG/.5ML
INJECTION, SOLUTION SUBCUTANEOUS
Qty: 2 ML | Refills: 0 | Status: SHIPPED | OUTPATIENT
Start: 2024-09-23

## 2024-09-29 RX ORDER — INSULIN GLARGINE 300 U/ML
INJECTION, SOLUTION SUBCUTANEOUS
Qty: 24 ML | Refills: 1 | Status: SHIPPED | OUTPATIENT
Start: 2024-09-29

## 2024-10-20 DIAGNOSIS — E11.65 TYPE 2 DIABETES MELLITUS WITH HYPERGLYCEMIA (HCC): ICD-10-CM

## 2024-10-20 RX ORDER — EMPAGLIFLOZIN 25 MG/1
25 TABLET, FILM COATED ORAL DAILY
Qty: 90 TABLET | Refills: 3 | Status: SHIPPED | OUTPATIENT
Start: 2024-10-20

## 2024-10-20 RX ORDER — TIRZEPATIDE 5 MG/.5ML
INJECTION, SOLUTION SUBCUTANEOUS
Qty: 2 ML | Refills: 5 | Status: SHIPPED | OUTPATIENT
Start: 2024-10-20

## 2024-10-25 DIAGNOSIS — Z79.4 TYPE 2 DIABETES MELLITUS WITH HYPERGLYCEMIA, WITH LONG-TERM CURRENT USE OF INSULIN (HCC): Primary | ICD-10-CM

## 2024-10-25 DIAGNOSIS — E11.65 TYPE 2 DIABETES MELLITUS WITH HYPERGLYCEMIA, WITH LONG-TERM CURRENT USE OF INSULIN (HCC): Primary | ICD-10-CM

## 2024-10-25 RX ORDER — TIRZEPATIDE 7.5 MG/.5ML
7.5 INJECTION, SOLUTION SUBCUTANEOUS
Qty: 2 ML | Refills: 1 | Status: SHIPPED | OUTPATIENT
Start: 2024-10-25

## 2024-10-30 DIAGNOSIS — Z79.4 TYPE 2 DIABETES MELLITUS WITH HYPERGLYCEMIA, WITH LONG-TERM CURRENT USE OF INSULIN (HCC): ICD-10-CM

## 2024-10-30 DIAGNOSIS — E11.65 TYPE 2 DIABETES MELLITUS WITH HYPERGLYCEMIA, WITH LONG-TERM CURRENT USE OF INSULIN (HCC): ICD-10-CM

## 2024-10-31 LAB
ALBUMIN/CREAT UR: 20 MG/G CREAT (ref 0–29)
BUN SERPL-MCNC: 17 MG/DL (ref 8–27)
BUN/CREAT SERPL: 40 (ref 12–28)
CALCIUM SERPL-MCNC: 9.1 MG/DL (ref 8.7–10.3)
CHLORIDE SERPL-SCNC: 108 MMOL/L (ref 96–106)
CO2 SERPL-SCNC: 21 MMOL/L (ref 20–29)
CREAT SERPL-MCNC: 0.42 MG/DL (ref 0.57–1)
CREAT UR-MCNC: 48.5 MG/DL
EGFRCR SERPLBLD CKD-EPI 2021: 109 ML/MIN/1.73
GLUCOSE SERPL-MCNC: 174 MG/DL (ref 70–99)
HBA1C MFR BLD: 6.8 % (ref 4.8–5.6)
MICROALBUMIN UR-MCNC: 9.5 UG/ML
POTASSIUM SERPL-SCNC: 4.3 MMOL/L (ref 3.5–5.2)
SODIUM SERPL-SCNC: 142 MMOL/L (ref 134–144)

## 2024-11-25 DIAGNOSIS — E11.65 TYPE 2 DIABETES MELLITUS WITH HYPERGLYCEMIA, WITH LONG-TERM CURRENT USE OF INSULIN (HCC): Primary | ICD-10-CM

## 2024-11-25 DIAGNOSIS — Z79.4 TYPE 2 DIABETES MELLITUS WITH HYPERGLYCEMIA, WITH LONG-TERM CURRENT USE OF INSULIN (HCC): Primary | ICD-10-CM

## 2024-11-25 RX ORDER — TIRZEPATIDE 10 MG/.5ML
INJECTION, SOLUTION SUBCUTANEOUS
Qty: 2 ML | Refills: 3 | Status: SHIPPED | OUTPATIENT
Start: 2024-11-25

## 2024-12-03 ENCOUNTER — OFFICE VISIT (OUTPATIENT)
Age: 64
End: 2024-12-03
Payer: COMMERCIAL

## 2024-12-03 VITALS
HEIGHT: 61 IN | BODY MASS INDEX: 32.85 KG/M2 | WEIGHT: 174 LBS | HEART RATE: 97 BPM | SYSTOLIC BLOOD PRESSURE: 166 MMHG | DIASTOLIC BLOOD PRESSURE: 78 MMHG

## 2024-12-03 DIAGNOSIS — Z79.4 TYPE 2 DIABETES MELLITUS WITH HYPERGLYCEMIA, WITH LONG-TERM CURRENT USE OF INSULIN (HCC): Primary | ICD-10-CM

## 2024-12-03 DIAGNOSIS — I10 PRIMARY HYPERTENSION: ICD-10-CM

## 2024-12-03 DIAGNOSIS — E78.2 MIXED HYPERLIPIDEMIA: ICD-10-CM

## 2024-12-03 DIAGNOSIS — E11.65 TYPE 2 DIABETES MELLITUS WITH HYPERGLYCEMIA, WITH LONG-TERM CURRENT USE OF INSULIN (HCC): Primary | ICD-10-CM

## 2024-12-03 PROCEDURE — 3078F DIAST BP <80 MM HG: CPT | Performed by: INTERNAL MEDICINE

## 2024-12-03 PROCEDURE — 3044F HG A1C LEVEL LT 7.0%: CPT | Performed by: INTERNAL MEDICINE

## 2024-12-03 PROCEDURE — 3077F SYST BP >= 140 MM HG: CPT | Performed by: INTERNAL MEDICINE

## 2024-12-03 PROCEDURE — G2211 COMPLEX E/M VISIT ADD ON: HCPCS | Performed by: INTERNAL MEDICINE

## 2024-12-03 PROCEDURE — 99214 OFFICE O/P EST MOD 30 MIN: CPT | Performed by: INTERNAL MEDICINE

## 2024-12-03 RX ORDER — TIRZEPATIDE 15 MG/.5ML
INJECTION, SOLUTION SUBCUTANEOUS
Qty: 2 ML | Refills: 5 | Status: SHIPPED | OUTPATIENT
Start: 2024-12-03

## 2024-12-03 RX ORDER — BLOOD-GLUCOSE METER
KIT MISCELLANEOUS
Qty: 1 KIT | Refills: 0 | Status: SHIPPED | OUTPATIENT
Start: 2024-12-03

## 2024-12-03 RX ORDER — LANCETS 30 GAUGE
EACH MISCELLANEOUS
Qty: 100 EACH | Refills: 5 | Status: SHIPPED | OUTPATIENT
Start: 2024-12-03

## 2024-12-03 RX ORDER — LISINOPRIL 40 MG/1
TABLET ORAL
Qty: 90 TABLET | Refills: 1 | Status: SHIPPED | OUTPATIENT
Start: 2024-12-03

## 2024-12-03 RX ORDER — GLUCOSAMINE HCL/CHONDROITIN SU 500-400 MG
CAPSULE ORAL
Qty: 100 STRIP | Refills: 5 | Status: SHIPPED | OUTPATIENT
Start: 2024-12-03

## 2024-12-03 RX ORDER — TIRZEPATIDE 12.5 MG/.5ML
INJECTION, SOLUTION SUBCUTANEOUS
Qty: 2 ML | Refills: 1 | Status: SHIPPED | OUTPATIENT
Start: 2024-12-03

## 2024-12-03 RX ORDER — FERROUS SULFATE 325(65) MG
1 TABLET ORAL DAILY
COMMUNITY
Start: 2024-11-29

## 2024-12-03 NOTE — PATIENT INSTRUCTIONS
Increase Mounjaro to 12.5mg once a week  After a month or 2 you can go to the 15mg dose (written Rx given)    Lower glipizide to 1 tablet twice a day and see how that goes

## 2024-12-03 NOTE — PROGRESS NOTES
interested    Will qualify for CGM once on medicare - in meantime update glucometer      2. Primary hypertension   On meds per PCP -stressed today b/c late d/t traffic      3. Mixed hyperlipidemia   On statin per PCP     Orders Placed This Encounter   Medications    MOUNJARO 12.5 MG/0.5ML SOAJ     Sig: Inject 12.5mg subcutaneously every 7 days     Dispense:  2 mL     Refill:  1    MOUNJARO 15 MG/0.5ML SOAJ     Sig: Inject 15mg subcutaneously every 7 days     Dispense:  2 mL     Refill:  5    blood glucose monitor strips     Sig: Test 3 times a day.  Dispense brand preferred by insurance.     Dispense:  100 strip     Refill:  5    Lancets MISC     Sig: Use to check sugars 3 times a day     Dispense:  100 each     Refill:  5    glucose monitoring kit     Sig: Use as directed to check sugars; dispense brand covered by insurance     Dispense:  1 kit     Refill:  0      Orders Placed This Encounter   Procedures    Microalbumin / Creatinine Urine Ratio     Standing Status:   Future     Standing Expiration Date:   12/3/2025    Basic Metabolic Panel     Standing Status:   Future     Standing Expiration Date:   12/3/2025    Hemoglobin A1C     Standing Status:   Future     Standing Expiration Date:   12/3/2025        Return in about 6 months (around 6/3/2025).

## 2024-12-16 ENCOUNTER — OFFICE VISIT (OUTPATIENT)
Age: 64
End: 2024-12-16
Payer: COMMERCIAL

## 2024-12-16 ENCOUNTER — TELEPHONE (OUTPATIENT)
Age: 64
End: 2024-12-16

## 2024-12-16 VITALS
HEIGHT: 61 IN | HEART RATE: 64 BPM | BODY MASS INDEX: 31.91 KG/M2 | SYSTOLIC BLOOD PRESSURE: 140 MMHG | DIASTOLIC BLOOD PRESSURE: 78 MMHG | WEIGHT: 169 LBS

## 2024-12-16 DIAGNOSIS — I20.0 UNSTABLE ANGINA (HCC): Primary | ICD-10-CM

## 2024-12-16 DIAGNOSIS — E78.5 DYSLIPIDEMIA: ICD-10-CM

## 2024-12-16 DIAGNOSIS — I10 PRIMARY HYPERTENSION: ICD-10-CM

## 2024-12-16 DIAGNOSIS — I10 ESSENTIAL HYPERTENSION: ICD-10-CM

## 2024-12-16 DIAGNOSIS — I20.0 UNSTABLE ANGINA PECTORIS (HCC): Primary | ICD-10-CM

## 2024-12-16 PROCEDURE — 93000 ELECTROCARDIOGRAM COMPLETE: CPT | Performed by: SPECIALIST

## 2024-12-16 PROCEDURE — 3078F DIAST BP <80 MM HG: CPT | Performed by: SPECIALIST

## 2024-12-16 PROCEDURE — 99204 OFFICE O/P NEW MOD 45 MIN: CPT | Performed by: SPECIALIST

## 2024-12-16 PROCEDURE — 3077F SYST BP >= 140 MM HG: CPT | Performed by: SPECIALIST

## 2024-12-16 RX ORDER — CARVEDILOL 6.25 MG/1
6.25 TABLET ORAL 2 TIMES DAILY
Qty: 60 TABLET | Refills: 12 | Status: SHIPPED | OUTPATIENT
Start: 2024-12-16

## 2024-12-16 RX ORDER — ASPIRIN 81 MG/1
81 TABLET ORAL DAILY
Qty: 90 TABLET | Refills: 0
Start: 2024-12-16

## 2024-12-16 RX ORDER — ATORVASTATIN CALCIUM 20 MG/1
TABLET, FILM COATED ORAL
Qty: 90 TABLET | Refills: 3 | Status: SHIPPED | OUTPATIENT
Start: 2024-12-16

## 2024-12-16 NOTE — TELEPHONE ENCOUNTER
Spoke with Pt regarding [facility] schd. For12/31/2024 At 9:15 AM arrive at 7:45 AM. Pt aware that they need a  they must stay on site NPO from Midnight the night before. You can have clear liquids up to 2 hours prior to procedure. Please drink 10 8oz glasses of water 3 days prior and 3 days after Cath. Check in at the second floor Outpt. Reg. Desk. Pt had labs done on 12/14/2024.     Medications:  Take 1/2 dose of Humira and Toujeo the day of procedure   Hold Monjaro for 7 days prior  Hold Glipizide the day of      VO by /nurse: Sharona THOMAS

## 2024-12-16 NOTE — PATIENT INSTRUCTIONS
to have before any type of surgery or procedure.   What happens on the day of the procedure?   Follow the instructions exactly about when to stop eating and drinking. If you don't, your procedure may be canceled. If your doctor told you to take your medicines on the day of the procedure, take them with only a sip of water.     Take a bath or shower before you come in for your procedure. Do not apply lotions, perfumes, deodorants, or nail polish.     Do not shave the procedure site yourself.     Take off all jewelry and piercings. And take out contact lenses, if you wear them.   At the hospital or surgery center   Bring a picture ID.     You will be kept comfortable and safe by your anesthesia provider. You may get medicine that relaxes you or puts you in a light sleep. The area being worked on will be numb.     After the procedure, pressure may be applied to the area where the catheter was put into your artery. This will help prevent bleeding. A small device may also be used to close the blood vessel. You may have a bandage or a compression device on the catheter site.     Nurses will check your heart rate and blood pressure. The nurse also will check the catheter site for bleeding.     If the catheter was put in your groin, you will need to lie still and keep your leg straight for up to a few hours. The nurse may put a weighted bag on your leg to help you keep it still.     If the catheter was put in your wrist, you may be able to sit up right away. But you will need to keep your arm still for at least one hour.     You may be able to go home later the same day, or you may need to stay in the hospital overnight.     You may have a bruise and feel sore where the catheter was put in. This is normal and will go away.   When should you call your doctor?   You have questions or concerns.     You don't understand how to prepare for your procedure.     You become ill before the procedure (such as fever, flu, or a cold).

## 2024-12-16 NOTE — PROGRESS NOTES
had concerns including New Patient, Chest Pain, and Shortness of Breath.    Vitals:    12/16/24 1348   BP: (!) 140/78   Site: Left Upper Arm   Position: Sitting   Pulse: 64   Weight: 76.7 kg (169 lb)   Height: 1.549 m (5' 1\")        Chest pain No    Refills No        1. Have you been to the ER, urgent care clinic since your last visit? [facility]; 12/14/24      Hospitalized since your last visit? No       Where?        Date?  
      Shilpi Eubanks MD, FACC    [facility] [place]  [facility]  [address], [address]  [address]  [place], [place]  [address]  [place] [address]  Ph: [phone]   Ph [phone]

## 2024-12-17 ENCOUNTER — DIRECT ADMIT ORDERS (OUTPATIENT)
Facility: HOSPITAL | Age: 64
End: 2024-12-17

## 2024-12-17 DIAGNOSIS — I20.0 UNSTABLE ANGINA (HCC): Primary | ICD-10-CM

## 2024-12-17 RX ORDER — SODIUM CHLORIDE 0.9 % (FLUSH) 0.9 %
5-40 SYRINGE (ML) INJECTION PRN
OUTPATIENT
Start: 2024-12-31

## 2024-12-17 RX ORDER — SODIUM CHLORIDE 9 MG/ML
INJECTION, SOLUTION INTRAVENOUS PRN
OUTPATIENT
Start: 2024-12-31

## 2024-12-17 RX ORDER — SODIUM CHLORIDE 9 MG/ML
INJECTION, SOLUTION INTRAVENOUS CONTINUOUS
OUTPATIENT
Start: 2024-12-31

## 2024-12-17 RX ORDER — SODIUM CHLORIDE 0.9 % (FLUSH) 0.9 %
5-40 SYRINGE (ML) INJECTION EVERY 12 HOURS SCHEDULED
OUTPATIENT
Start: 2024-12-31 | End: 2024-12-31

## 2024-12-30 ENCOUNTER — TELEPHONE (OUTPATIENT)
Age: 64
End: 2024-12-30

## 2024-12-30 NOTE — TELEPHONE ENCOUNTER
Patient is calling because she need a letter for work stating how long she will be out and what other restrictions for her [facility] procedure.Please give a call back.    [phone]

## 2025-01-02 ENCOUNTER — TELEPHONE (OUTPATIENT)
Age: 65
End: 2025-01-02

## 2025-01-02 NOTE — TELEPHONE ENCOUNTER
Patient has a procedure on 1/3/25, will need to be cancelled, patient has a head cold, would like a call back to reschedule.       Pt# [phone]

## 2025-01-10 DIAGNOSIS — E11.65 TYPE 2 DIABETES MELLITUS WITH HYPERGLYCEMIA (HCC): ICD-10-CM

## 2025-01-10 RX ORDER — GLIPIZIDE 5 MG/1
10 TABLET ORAL 2 TIMES DAILY
Qty: 360 TABLET | Refills: 3 | Status: SHIPPED | OUTPATIENT
Start: 2025-01-10

## 2025-01-15 DIAGNOSIS — Z79.4 TYPE 2 DIABETES MELLITUS WITH HYPERGLYCEMIA, WITH LONG-TERM CURRENT USE OF INSULIN (HCC): ICD-10-CM

## 2025-01-15 DIAGNOSIS — E11.65 TYPE 2 DIABETES MELLITUS WITH HYPERGLYCEMIA, WITH LONG-TERM CURRENT USE OF INSULIN (HCC): ICD-10-CM

## 2025-01-15 RX ORDER — TIRZEPATIDE 15 MG/.5ML
INJECTION, SOLUTION SUBCUTANEOUS
Qty: 2 ML | Refills: 5 | Status: SHIPPED | OUTPATIENT
Start: 2025-01-15

## 2025-01-28 ENCOUNTER — TELEPHONE (OUTPATIENT)
Age: 65
End: 2025-01-28

## 2025-01-28 NOTE — TELEPHONE ENCOUNTER
Placed outbound call to patient to request updated details of location of abscess (internal or superficial, left or right side, etc.) and to verify which [facility] location she was seen in order to request the referral order and visit notes to be faxed prior to patient's scheduled appt on Thursday 01/30/2025. Patient did not answer, LVM requesting call back to discuss appt details. Provided office contact info in message.

## 2025-01-29 NOTE — TELEPHONE ENCOUNTER
Patient called back and stated the abscess is not on the inside on her chest cavity, more towards the surface of her skin in between breast. Patient also stated the [facility] location she went to on 01/24/2025 was [address] [place] [address]. Updated patient's appt notes tomorrow and placed outbound call to [facility] once disconnected with patient. Was able to reach [facility] at [phone] and spoke with Ramiro who stated they will fax the patient's referral and visit notes from 01/24/2025. Confirmed office phone and fax # with R[facility] and disconnected.

## 2025-01-30 ENCOUNTER — OFFICE VISIT (OUTPATIENT)
Age: 65
End: 2025-01-30
Payer: COMMERCIAL

## 2025-01-30 VITALS
OXYGEN SATURATION: 93 % | RESPIRATION RATE: 16 BRPM | BODY MASS INDEX: 31.38 KG/M2 | WEIGHT: 166.2 LBS | TEMPERATURE: 97.5 F | HEART RATE: 93 BPM | SYSTOLIC BLOOD PRESSURE: 125 MMHG | HEIGHT: 61 IN | DIASTOLIC BLOOD PRESSURE: 76 MMHG

## 2025-01-30 DIAGNOSIS — L72.3 SEBACEOUS CYST: Primary | ICD-10-CM

## 2025-01-30 PROCEDURE — 3074F SYST BP LT 130 MM HG: CPT | Performed by: SURGERY

## 2025-01-30 PROCEDURE — 99204 OFFICE O/P NEW MOD 45 MIN: CPT | Performed by: SURGERY

## 2025-01-30 PROCEDURE — 3078F DIAST BP <80 MM HG: CPT | Performed by: SURGERY

## 2025-01-30 ASSESSMENT — PATIENT HEALTH QUESTIONNAIRE - PHQ9
2. FEELING DOWN, DEPRESSED OR HOPELESS: NOT AT ALL
SUM OF ALL RESPONSES TO PHQ QUESTIONS 1-9: 0
SUM OF ALL RESPONSES TO PHQ9 QUESTIONS 1 & 2: 0
SUM OF ALL RESPONSES TO PHQ QUESTIONS 1-9: 0
SUM OF ALL RESPONSES TO PHQ QUESTIONS 1-9: 0
1. LITTLE INTEREST OR PLEASURE IN DOING THINGS: NOT AT ALL
SUM OF ALL RESPONSES TO PHQ QUESTIONS 1-9: 0

## 2025-01-30 NOTE — PROGRESS NOTES
Surgery Progress Note    1/30/2025    had concerns including New Patient and Cyst (Sebaceous on chest wall/sternal area).     Subjective:     Patient is a 64 y.o. female who reports that for the past 2 months she started to develop what look like abscesses or cysts, one mid chest and the other mid upper abdomen.  They have slowly gotten larger in size and one of them drained for the first time this past week.  She denies any abscesses or cysts anywhere else.  Denies any fevers or chills.  Reports there are slightly tender to palpation but otherwise no other issues.  Patient has a past medical history of diabetes mellitus type 2 and reports it is well-controlled.  She does take 81 mg aspirin as a precaution.        Constitutional: No fever or chills  Neurologic: No headache  Eyes: No scleral icterus or irritated eyes  Nose: No nasal pain or drainage  Mouth: No oral lesions or sore throat  Cardiac: No palpations or chest pain  Pulmonary: No cough or shortness of breath  Gastrointestinal: No nausea, emesis, diarrhea, or constipation  Genitourinary: No dysuria  Musculoskeletal: No muscle or joint tenderness  Skin: No rashes or lesions  Psychiatric: No anxiety or depressed mood    Current Outpatient Medications   Medication Instructions    amitriptyline (ELAVIL) 25 MG tablet Oral    amLODIPine (NORVASC) 5 MG tablet Oral, DAILY    aspirin 81 mg, Oral, DAILY    atorvastatin (LIPITOR) 20 MG tablet TAKE 1 TABLET DAILY IN THE EVENING    blood glucose monitor strips Test 3 times a day.  Dispense brand preferred by insurance.    carvedilol (COREG) 6.25 mg, Oral, 2 TIMES DAILY    ferrous sulfate (IRON 325) 325 (65 Fe) MG tablet 1 tablet, Oral, DAILY    glipiZIDE (GLUCOTROL) 10 mg, Oral, 2 TIMES DAILY    glucose monitoring kit Use as directed to check sugars; dispense brand covered by insurance    HUMIRA, 2 PEN, 40 MG/0.4ML PNKT     Insulin Glargine, 2 Unit Dial, (TOUJEO MAX SOLOSTAR) 300 UNIT/ML SOPN ADMINISTER UP TO 80 UNITS

## 2025-01-30 NOTE — PROGRESS NOTES
Identified pt with two pt identifiers (name and ). Reviewed chart in preparation for visit and have obtained necessary documentation.    Claire Briones is a 64 y.o. female  Chief Complaint   Patient presents with    New Patient    Cyst     Sebaceous on chest wall/sternal area     /76 (Site: Left Upper Arm, Position: Sitting, Cuff Size: Large Adult)   Pulse 93   Temp 97.5 °F (36.4 °C) (Oral)   Resp 16   Ht 1.549 m (5' 1\")   Wt 75.4 kg (166 lb 3.2 oz)   SpO2 93%   BMI 31.40 kg/m²     1. Have you been to the ER, urgent care clinic since your last visit?  Hospitalized since your last visit?no    2. Have you seen or consulted any other health care providers outside of the [facility] since your last visit?  Include any pap smears or colon screening. no

## 2025-01-31 ENCOUNTER — PREP FOR PROCEDURE (OUTPATIENT)
Age: 65
End: 2025-01-31

## 2025-01-31 ENCOUNTER — TELEPHONE (OUTPATIENT)
Age: 65
End: 2025-01-31

## 2025-01-31 DIAGNOSIS — L72.3 SEBACEOUS CYST: ICD-10-CM

## 2025-01-31 NOTE — TELEPHONE ENCOUNTER
Called and spoke to PT, confirmed surgery with Dr. Desouza on 2/11/25. I informed PT per Dr. Desouza to stop taking the 81 mg of Asprin prior to surgery, PT understood. PT confirmed address and surgical letter will be sent in the mail and uploaded to [employer].

## 2025-02-04 ENCOUNTER — TELEPHONE (OUTPATIENT)
Age: 65
End: 2025-02-04

## 2025-02-04 NOTE — TELEPHONE ENCOUNTER
Returned call to patient and verified using two patient identifiers.  Advised patient that her surgery to remove a sebaceous cyst on 2/11/25 will be a same day procedure that is planned with MAC and local anesthesia.  Advised patient that she should not drive for at least 24 hours after receiving MAC to make sure all medication has left the system.  Pt understood and was thankful for the call.

## 2025-02-04 NOTE — TELEPHONE ENCOUNTER
Identified pt with 2 pt identifiers    Patient called with questions regarding upcoming surgery. Questions relayed below:     How long will I expect to be out of work? I'm looking to take time off work and just need a timeline.    Is it a one day procedure? Will there be a hospital stay?     Advised pt that a message would be sent to nursing staff    Patient understood.

## 2025-02-07 NOTE — PERIOP NOTE
Patient was not scheduled to come to [facility] for preoop, however when creating chart for surgery scheduled with Dr. Desouza on 2/11/25, I realized pt takes Mounjaro weekly, I s/w patient, she said that her last dose was 2/2/25.  I explained importance and reason for stopping Mounjaro for at least 7 days prior to surgery.  Patient stated she understood and would be compliant.  She also knows to not take any diabetic medications AM of surgery.

## 2025-02-10 ENCOUNTER — ANESTHESIA EVENT (OUTPATIENT)
Facility: HOSPITAL | Age: 65
End: 2025-02-10
Payer: COMMERCIAL

## 2025-02-10 NOTE — PERIOP NOTE
[facility]                   [address] [place] [address]   MAIN OR                               [phone]   MAIN PRE OP                       [phone]                                                                                AMBULATORY PRE OP       [phone]  PRE-ADMISSION TESTING [phone]     Surgery Date:  Tuesday, February 11, 2025       Is surgery arrival time given by surgeon?  YES  NO  If “NO”, [facility] staff will call you between 3 and 7pm the day before your surgery with your arrival time.   (If your surgery is on a Monday, we will call you the Friday before.)    Call [phone] after 7pm Monday-Friday if you did not receive this call.    INSTRUCTIONS BEFORE YOUR SURGERY   When You  Arrive Free  parking is available from 7:00 AM - 5:00 PM.  Arrive at the 2nd Floor Admitting Desk on the day of your surgery.  Have your insurance card, photo ID, and any copayment (if needed).   Food   and   Drink No food or drink (gum, mints, coffee, juice, etc) after midnight the night before surgery.   No alcohol (beer, wine, liquor) or marijuana 24 hours before and after surgery.    You may drink WATER ONLY up until 2 hours prior to your surgery time.   Please do not add anything to your water as this may result in your surgery being postponed.      Pre- operative  Medication Instructions   MEDICATIONS TO TAKE THE MORNING OF SURGERY WITH A SIP OF WATER:     Amlodipine  Carvedilol  Leflunomide    DO NOT TAKE THE FOLLOWING MEDICATIONS THE MORNING OF SURGERY:          Glipizide        LIsinopril      SPECIAL INSTRUCTIONS:              Please contact your prescribing doctor for instructions for taking Insulin on day of surgery    Tylenol may be taken as needed.   Medications  To  STOP      7 days before surgery Non-Steroidal anti-inflammatory Drugs (NSAID's): for example, Ibuprofen (Advil, Motrin), Naproxen (Aleve).  Aspirin, if taking for pain/

## 2025-02-10 NOTE — PERIOP NOTE
DEWEYM with Dr. Desouza's office requesting pre-op orders to be placed into [facility] prior to surgery.  DOS 2/11/2025

## 2025-02-11 ENCOUNTER — ANESTHESIA (OUTPATIENT)
Facility: HOSPITAL | Age: 65
End: 2025-02-11
Payer: COMMERCIAL

## 2025-02-11 ENCOUNTER — HOSPITAL ENCOUNTER (OUTPATIENT)
Facility: HOSPITAL | Age: 65
Setting detail: OUTPATIENT SURGERY
Discharge: HOME | End: 2025-02-11
Attending: SURGERY | Admitting: SURGERY
Payer: COMMERCIAL

## 2025-02-11 VITALS
RESPIRATION RATE: 16 BRPM | TEMPERATURE: 98 F | HEART RATE: 84 BPM | OXYGEN SATURATION: 99 % | HEIGHT: 61 IN | SYSTOLIC BLOOD PRESSURE: 120 MMHG | DIASTOLIC BLOOD PRESSURE: 75 MMHG | WEIGHT: 171.74 LBS | BODY MASS INDEX: 32.42 KG/M2

## 2025-02-11 LAB
GLUCOSE BLD STRIP.AUTO-MCNC: 98 MG/DL (ref 65–117)
SERVICE CMNT-IMP: NORMAL

## 2025-02-11 PROCEDURE — 3600000002 HC SURGERY LEVEL 2 BASE: Performed by: SURGERY

## 2025-02-11 PROCEDURE — 7100000001 HC PACU RECOVERY - ADDTL 15 MIN: Performed by: SURGERY

## 2025-02-11 PROCEDURE — 2500000003 HC RX 250 WO HCPCS: Performed by: NURSE ANESTHETIST, CERTIFIED REGISTERED

## 2025-02-11 PROCEDURE — 11403 EXC TR-EXT B9+MARG 2.1-3CM: CPT | Performed by: SURGERY

## 2025-02-11 PROCEDURE — 88304 TISSUE EXAM BY PATHOLOGIST: CPT

## 2025-02-11 PROCEDURE — 3700000001 HC ADD 15 MINUTES (ANESTHESIA): Performed by: SURGERY

## 2025-02-11 PROCEDURE — 2580000003 HC RX 258: Performed by: ANESTHESIOLOGY

## 2025-02-11 PROCEDURE — 6360000002 HC RX W HCPCS: Performed by: SURGERY

## 2025-02-11 PROCEDURE — 6360000002 HC RX W HCPCS: Performed by: NURSE ANESTHETIST, CERTIFIED REGISTERED

## 2025-02-11 PROCEDURE — 82962 GLUCOSE BLOOD TEST: CPT

## 2025-02-11 PROCEDURE — 3600000012 HC SURGERY LEVEL 2 ADDTL 15MIN: Performed by: SURGERY

## 2025-02-11 PROCEDURE — 2709999900 HC NON-CHARGEABLE SUPPLY: Performed by: SURGERY

## 2025-02-11 PROCEDURE — 12032 INTMD RPR S/A/T/EXT 2.6-7.5: CPT | Performed by: SURGERY

## 2025-02-11 PROCEDURE — 3700000000 HC ANESTHESIA ATTENDED CARE: Performed by: SURGERY

## 2025-02-11 PROCEDURE — 7100000000 HC PACU RECOVERY - FIRST 15 MIN: Performed by: SURGERY

## 2025-02-11 PROCEDURE — 7100000010 HC PHASE II RECOVERY - FIRST 15 MIN: Performed by: SURGERY

## 2025-02-11 PROCEDURE — 7100000011 HC PHASE II RECOVERY - ADDTL 15 MIN: Performed by: SURGERY

## 2025-02-11 PROCEDURE — 11402 EXC TR-EXT B9+MARG 1.1-2 CM: CPT | Performed by: SURGERY

## 2025-02-11 RX ORDER — FAMOTIDINE 10 MG/ML
INJECTION, SOLUTION INTRAVENOUS
Status: DISCONTINUED | OUTPATIENT
Start: 2025-02-11 | End: 2025-02-11 | Stop reason: SDUPTHER

## 2025-02-11 RX ORDER — LIDOCAINE HYDROCHLORIDE 10 MG/ML
1 INJECTION, SOLUTION EPIDURAL; INFILTRATION; INTRACAUDAL; PERINEURAL
Status: DISCONTINUED | OUTPATIENT
Start: 2025-02-11 | End: 2025-02-11 | Stop reason: HOSPADM

## 2025-02-11 RX ORDER — SODIUM CHLORIDE 9 MG/ML
INJECTION, SOLUTION INTRAVENOUS CONTINUOUS
Status: DISCONTINUED | OUTPATIENT
Start: 2025-02-11 | End: 2025-02-11 | Stop reason: HOSPADM

## 2025-02-11 RX ORDER — GLYCOPYRROLATE 0.2 MG/ML
INJECTION INTRAMUSCULAR; INTRAVENOUS
Status: DISCONTINUED | OUTPATIENT
Start: 2025-02-11 | End: 2025-02-11 | Stop reason: SDUPTHER

## 2025-02-11 RX ORDER — KETOROLAC TROMETHAMINE 30 MG/ML
INJECTION, SOLUTION INTRAMUSCULAR; INTRAVENOUS
Status: DISCONTINUED | OUTPATIENT
Start: 2025-02-11 | End: 2025-02-11 | Stop reason: SDUPTHER

## 2025-02-11 RX ORDER — NALOXONE HYDROCHLORIDE 0.4 MG/ML
INJECTION, SOLUTION INTRAMUSCULAR; INTRAVENOUS; SUBCUTANEOUS PRN
Status: DISCONTINUED | OUTPATIENT
Start: 2025-02-11 | End: 2025-02-11 | Stop reason: HOSPADM

## 2025-02-11 RX ORDER — ONDANSETRON 2 MG/ML
4 INJECTION INTRAMUSCULAR; INTRAVENOUS
Status: DISCONTINUED | OUTPATIENT
Start: 2025-02-11 | End: 2025-02-11 | Stop reason: HOSPADM

## 2025-02-11 RX ORDER — MIDAZOLAM HYDROCHLORIDE 2 MG/2ML
2 INJECTION, SOLUTION INTRAMUSCULAR; INTRAVENOUS
Status: DISCONTINUED | OUTPATIENT
Start: 2025-02-11 | End: 2025-02-11 | Stop reason: HOSPADM

## 2025-02-11 RX ORDER — FENTANYL CITRATE 50 UG/ML
100 INJECTION, SOLUTION INTRAMUSCULAR; INTRAVENOUS
Status: DISCONTINUED | OUTPATIENT
Start: 2025-02-11 | End: 2025-02-11 | Stop reason: HOSPADM

## 2025-02-11 RX ORDER — MIDAZOLAM HYDROCHLORIDE 1 MG/ML
INJECTION, SOLUTION INTRAMUSCULAR; INTRAVENOUS
Status: DISCONTINUED | OUTPATIENT
Start: 2025-02-11 | End: 2025-02-11 | Stop reason: SDUPTHER

## 2025-02-11 RX ORDER — DIPHENHYDRAMINE HYDROCHLORIDE 50 MG/ML
12.5 INJECTION INTRAMUSCULAR; INTRAVENOUS
Status: DISCONTINUED | OUTPATIENT
Start: 2025-02-11 | End: 2025-02-11 | Stop reason: HOSPADM

## 2025-02-11 RX ORDER — BUPIVACAINE HYDROCHLORIDE 2.5 MG/ML
INJECTION, SOLUTION EPIDURAL; INFILTRATION; INTRACAUDAL PRN
Status: DISCONTINUED | OUTPATIENT
Start: 2025-02-11 | End: 2025-02-11 | Stop reason: ALTCHOICE

## 2025-02-11 RX ORDER — FENTANYL CITRATE 50 UG/ML
INJECTION, SOLUTION INTRAMUSCULAR; INTRAVENOUS
Status: DISCONTINUED | OUTPATIENT
Start: 2025-02-11 | End: 2025-02-11 | Stop reason: SDUPTHER

## 2025-02-11 RX ORDER — PROPOFOL 10 MG/ML
INJECTION, EMULSION INTRAVENOUS
Status: DISCONTINUED | OUTPATIENT
Start: 2025-02-11 | End: 2025-02-11 | Stop reason: SDUPTHER

## 2025-02-11 RX ORDER — DEXMEDETOMIDINE HYDROCHLORIDE 100 UG/ML
INJECTION, SOLUTION INTRAVENOUS
Status: DISCONTINUED | OUTPATIENT
Start: 2025-02-11 | End: 2025-02-11 | Stop reason: SDUPTHER

## 2025-02-11 RX ORDER — SODIUM CHLORIDE, SODIUM LACTATE, POTASSIUM CHLORIDE, CALCIUM CHLORIDE 600; 310; 30; 20 MG/100ML; MG/100ML; MG/100ML; MG/100ML
INJECTION, SOLUTION INTRAVENOUS CONTINUOUS
Status: DISCONTINUED | OUTPATIENT
Start: 2025-02-11 | End: 2025-02-11 | Stop reason: HOSPADM

## 2025-02-11 RX ORDER — ONDANSETRON 2 MG/ML
INJECTION INTRAMUSCULAR; INTRAVENOUS
Status: DISCONTINUED | OUTPATIENT
Start: 2025-02-11 | End: 2025-02-11 | Stop reason: SDUPTHER

## 2025-02-11 RX ADMIN — DEXMEDETOMIDINE 4 MCG: 100 INJECTION, SOLUTION INTRAVENOUS at 09:54

## 2025-02-11 RX ADMIN — ONDANSETRON HYDROCHLORIDE 4 MG: 2 SOLUTION INTRAMUSCULAR; INTRAVENOUS at 09:39

## 2025-02-11 RX ADMIN — MIDAZOLAM HYDROCHLORIDE 2 MG: 1 INJECTION, SOLUTION INTRAMUSCULAR; INTRAVENOUS at 09:38

## 2025-02-11 RX ADMIN — DEXMEDETOMIDINE 4 MCG: 100 INJECTION, SOLUTION INTRAVENOUS at 09:46

## 2025-02-11 RX ADMIN — FAMOTIDINE 20 MG: 10 INJECTION INTRAVENOUS at 09:39

## 2025-02-11 RX ADMIN — MIDAZOLAM HYDROCHLORIDE 1 MG: 1 INJECTION, SOLUTION INTRAMUSCULAR; INTRAVENOUS at 09:59

## 2025-02-11 RX ADMIN — KETOROLAC TROMETHAMINE 30 MG: 30 INJECTION, SOLUTION INTRAMUSCULAR at 10:25

## 2025-02-11 RX ADMIN — DEXMEDETOMIDINE 4 MCG: 100 INJECTION, SOLUTION INTRAVENOUS at 09:59

## 2025-02-11 RX ADMIN — PROPOFOL 100 MCG/KG/MIN: 10 INJECTION, EMULSION INTRAVENOUS at 09:45

## 2025-02-11 RX ADMIN — FENTANYL CITRATE 25 MCG: 50 INJECTION, SOLUTION INTRAMUSCULAR; INTRAVENOUS at 10:01

## 2025-02-11 RX ADMIN — DEXMEDETOMIDINE 4 MCG: 100 INJECTION, SOLUTION INTRAVENOUS at 09:56

## 2025-02-11 RX ADMIN — FENTANYL CITRATE 50 MCG: 50 INJECTION, SOLUTION INTRAMUSCULAR; INTRAVENOUS at 09:45

## 2025-02-11 RX ADMIN — SODIUM CHLORIDE: 9 INJECTION, SOLUTION INTRAVENOUS at 07:47

## 2025-02-11 RX ADMIN — GLYCOPYRROLATE 0.2 MG: 0.2 INJECTION, SOLUTION INTRAMUSCULAR; INTRAVENOUS at 09:39

## 2025-02-11 RX ADMIN — MIDAZOLAM HYDROCHLORIDE 1 MG: 1 INJECTION, SOLUTION INTRAMUSCULAR; INTRAVENOUS at 09:42

## 2025-02-11 ASSESSMENT — PAIN - FUNCTIONAL ASSESSMENT: PAIN_FUNCTIONAL_ASSESSMENT: 0-10

## 2025-02-11 ASSESSMENT — PAIN SCALES - GENERAL: PAINLEVEL_OUTOF10: 0

## 2025-02-11 NOTE — DISCHARGE INSTRUCTIONS
POST-OPERATIVE INSTRUCTIONS  OUTPATIENT SURGERY - Cyst/Lipoma Excision    Today you had a soft tissue mass that was removed, which is usually well tolerated. However, below is a list of instructions which are important for you to follow. If you have any questions, please call your surgeon’s office.    1 EATING: Please eat lightly when you go home today for the first 24 hours. You may resume your regular diet after that.    2. EXERCISE: Limit your exercise for the first week, although stairs or other walking is fine. If you received stitches during your suregery, avoid strenuous exercise or heavy lifting over 10-15 lbs until your 2 week visit in clinic.     3. DRESSING: You may shower in 1 day, unless otherwise instructed by your surgeon.  No pools, baths, or hot tubs for 2 weeks.    4. NO LIFTING: No lifting >10lbs for 2 weeks from day of surgery if you received sutures or stiches.    5. DRIVING: You may begin driving tomorrow once the anesthesia has worn off, unless you are having significant pain that would inhibit your ability to drive.    6. PAIN: For your pain, take scheduled medications: 600-800 mg ibuprofen/motrin every 8 hours with food and water, and 1000 mg tylenol between meals 6 hours. Do not take more than 3000mg tylenol daily. Do not take ibuprofen/motrin if you have any history of gastrointestinal bleeding, peptic ulcers or if you have been told not to by your PCP. These may be purchased at a pharmacy or grocery store.     7. WOUND: If you notice any increased redness, swelling, pain or fever, please call the office. If this is noticed at a time after normal office hours, please call our answering service.    8. APPOINTMENT: You may already have a follow up appointment. If not your surgeon would like to see you in his/her office in 14 days, call to make your follow up at [phone].    If this appointment has not been made for you prior to your departure from Outpatient Surgery, please call your

## 2025-02-11 NOTE — BRIEF OP NOTE
Brief Postoperative Note      Patient: Claire Briones  YOB: 1960  MRN: [id number]    Date of Procedure: 2/11/2025    Pre-Op Diagnosis Codes:      * Sebaceous cyst [L72.3] x 2    Post-Op Diagnosis: Same       Procedure(s):  EXCISION OF SEBACEOUS CYST UPPER MIDLINE ABDOMEN, EXCISION OF SEBACEOUS CYST MID CHEST (MAC WITH LOCAL)  .    Surgeon(s):  Clementine Desouza MD    Assistant:  Surgical Assistant: Fabi Joy Assistant: Chin Preciado PA    Anesthesia: Monitor Anesthesia Care    Estimated Blood Loss (mL): Minimal    Complications: None    Specimens:   ID Type Source Tests Collected by Time Destination   1 : sebaceous cyst- from chest wall & upper abdomen Tissue Tissue SURGICAL PATHOLOGY Clementine Desouza MD 2/11/2025 1005        Implants:  none      Drains: none    Findings:  Infection Present At Time Of Surgery (PATOS) (choose all levels that have infection present):  No infection present  Other Findings: 3cm x 2cm midline chest sebaceous cyst, 1.5cm, x 2cm upper abdominal wall sebaceous cyst  This procedure was not performed to treat cancer       Electronically signed by Clementine Desouza MD on 2/11/2025 at 10:39 AM

## 2025-02-11 NOTE — ANESTHESIA PRE PROCEDURE
Department of Anesthesiology  Preprocedure Note       Name:  Claire Briones   Age:  64 y.o.  :  1960                                          MRN:  [id number]         Date:  2025      Surgeon: Surgeon(s):  Clementine Desouza MD    Procedure: Procedure(s):  EXCISION OF SEBACEOUS CYST UPPER MIDLINE ABDOMEN, EXCISION OF SEBACEOUS CYST MID CHEST (MAC WITH LOCAL)  .    Medications prior to admission:   Prior to Admission medications    Medication Sig Start Date End Date Taking? Authorizing Provider   glipiZIDE (GLUCOTROL) 5 MG tablet TAKE 2 TABLETS BY MOUTH TWICE A DAY 1/10/25  Yes Maeve Lucero MD   carvedilol (COREG) 6.25 MG tablet Take 1 tablet by mouth 2 times daily 24  Yes Shilpi Eubanks MD   atorvastatin (LIPITOR) 20 MG tablet TAKE 1 TABLET DAILY IN THE EVENING 24  Yes Shilpi Eubanks MD   lisinopril (PRINIVIL;ZESTRIL) 40 MG tablet TAKE 1 TABLET BY MOUTH EVERY DAY 12/3/24  Yes Mavee Lucero MD   ferrous sulfate (IRON 325) 325 (65 Fe) MG tablet Take 1 tablet by mouth daily 24  Yes ProviderIlana MD   Insulin Glargine, 2 Unit Dial, (TOUJEO MAX SOLOSTAR) 300 UNIT/ML SOPN ADMINISTER UP TO 80 UNITS UNDER THE SKIN EVERY DAY 24  Yes aMeve Lucero MD   leflunomide (ARAVA) 20 MG tablet Take 1 tablet by mouth daily 23  Yes Provider, MD Ilana   amLODIPine (NORVASC) 5 MG tablet Take by mouth daily   Yes Automatic Reconciliation, Ar   MOUNJARO 15 MG/0.5ML SOAJ Inject 15mg subcutaneously every 7 days 1/15/25   Maeve Lucero MD   aspirin 81 MG EC tablet Take 1 tablet by mouth daily 24   Shilpi Eubanks MD   blood glucose monitor strips Test 3 times a day.  Dispense brand preferred by insurance. 12/3/24   Maeve Lucero MD   Lancets MISC Use to check sugars 3 times a day 12/3/24   Maeve Lucero MD   glucose monitoring kit Use as directed to check sugars; dispense brand covered by insurance 12/3/24   Maeve Lucero MD   HUMIRA, 2

## 2025-02-11 NOTE — OP NOTE
OPERATIVE REPORT - Excision Abdominal Wall Mass    PREOPERATIVE DIAGNOSIS: sebaceous cyst chest all and upper abdominal wall    POSTOPERATIVE DIAGNOSIS: Same    OPERATIVE PROCEDURE:  EXCISION OF SEBACEOUS CYST UPPER MIDLINE ABDOMEN, EXCISION OF SEBACEOUS CYST MID CHEST (MAC WITH LOCAL)       SURGEON: Clementine Desouza MD    ANESTHESIA: MAC with local    COMPLICATIONS: None    ESTIMATED BLOOD LOSS: Minimal.    INDICATION: Documented in the history and physical.    FINDINGS:  3cm x 2cm midline chest sebaceous cyst, 1.5cm, x 2cm upper abdominal wall sebaceous cyst    PROCEDURE:   The patient was taken to the operating room and placed in the supine position and following MAC    the abdomen and chest was prepped and draped in the usual sterile fashion.  Local anesthetic was injected to both areas  A 2.5 cm elliptical transverse incision was made over the area where cyst was palpated.  This was carried down the subcutaneous tissues with a bovie cautery.  Cyst was grapsed and using cautery and retraction, the cyst was excised.  Hemostasis obtained using cautery.   Deep dermal stitches were taken with 3-0 Vicryl.   The skin was approximated with a running 4-0 Vicryl subcuticular suture. Dermabond was applied as a dressing.   Attention was then turned to mid chest area  A 2.5 cm elliptical transverse incision was made over the area where cyst was palpated.  This was carried down the subcutaneous tissues with a bovie cautery.  Cyst was grapsed and using cautery and retraction, the cyst was excised.  Hemostasis obtained using cautery.   Deep dermal stitches were taken with 3-0 Vicryl.   The skin was approximated with a running 4-0 Vicryl subcuticular suture. Dermabond was applied as a dressing.       The patient tolerated the procedure well, and transferred to the recovery room in satisfactory condition.        SPECIMEN: 3cm x 2cm midline chest sebaceous cyst, 1.5cm, x 2cm upper abdominal wall sebaceous cyst     COUNTS:

## 2025-02-11 NOTE — ANESTHESIA POSTPROCEDURE EVALUATION
Department of Anesthesiology  Postprocedure Note    Patient: Claire Briones  MRN: [id number]  YOB: 1960  Date of evaluation: 2/11/2025    Procedure Summary       Date: 02/11/25 Room / Location: [facility] MAIN OR  / [facility] MAIN OR    Anesthesia Start: 0938 Anesthesia Stop: 1040    Procedures:       EXCISION OF SEBACEOUS CYST UPPER MIDLINE ABDOMEN, EXCISION OF SEBACEOUS CYST MID CHEST (MAC WITH LOCAL) (Abdomen)      . (Abdomen) Diagnosis:       Sebaceous cyst      (Sebaceous cyst [L72.3])    Surgeons: Clementine Desouza MD Responsible Provider: Jax Coburn MD    Anesthesia Type: MAC ASA Status: 3            Anesthesia Type: MAC    Gaston Phase I: Gaston Score: 10    Gaston Phase II:      Anesthesia Post Evaluation    Patient location during evaluation: PACU  Patient participation: complete - patient participated  Level of consciousness: awake  Airway patency: patent  Nausea & Vomiting: no vomiting and no nausea  Cardiovascular status: hemodynamically stable  Respiratory status: acceptable  Hydration status: stable  Pain management: adequate    No notable events documented.

## 2025-02-13 ENCOUNTER — TELEPHONE (OUTPATIENT)
Age: 65
End: 2025-02-13

## 2025-02-13 NOTE — TELEPHONE ENCOUNTER
Patient contacted the office stating that she is having some pinkish discharge from the excision area.  Advised patient this is ok and she should loosely cover the area with gauze and tape that in place to collect the drainage.  Pt understood and was thankful for the advice.

## 2025-02-20 ENCOUNTER — TELEPHONE (OUTPATIENT)
Age: 65
End: 2025-02-20

## 2025-02-20 ENCOUNTER — OFFICE VISIT (OUTPATIENT)
Age: 65
End: 2025-02-20

## 2025-02-20 VITALS
OXYGEN SATURATION: 96 % | SYSTOLIC BLOOD PRESSURE: 135 MMHG | BODY MASS INDEX: 32.25 KG/M2 | DIASTOLIC BLOOD PRESSURE: 77 MMHG | HEIGHT: 61 IN | HEART RATE: 95 BPM | TEMPERATURE: 97.9 F | RESPIRATION RATE: 16 BRPM | WEIGHT: 170.8 LBS

## 2025-02-20 DIAGNOSIS — Z48.89 POSTOPERATIVE VISIT: Primary | ICD-10-CM

## 2025-02-20 PROCEDURE — 99024 POSTOP FOLLOW-UP VISIT: CPT | Performed by: SURGERY

## 2025-02-20 ASSESSMENT — PATIENT HEALTH QUESTIONNAIRE - PHQ9
SUM OF ALL RESPONSES TO PHQ QUESTIONS 1-9: 0
1. LITTLE INTEREST OR PLEASURE IN DOING THINGS: NOT AT ALL
2. FEELING DOWN, DEPRESSED OR HOPELESS: NOT AT ALL
SUM OF ALL RESPONSES TO PHQ QUESTIONS 1-9: 0
SUM OF ALL RESPONSES TO PHQ9 QUESTIONS 1 & 2: 0
SUM OF ALL RESPONSES TO PHQ QUESTIONS 1-9: 0
SUM OF ALL RESPONSES TO PHQ QUESTIONS 1-9: 0

## 2025-02-20 NOTE — PROGRESS NOTES
Identified pt with two pt identifiers (name and ). Reviewed chart in preparation for visit and have obtained necessary documentation.    Claire Briones is a 64 y.o. female  Chief Complaint   Patient presents with    Post-Op Check     2 weeks postop excision of sebaceous cyst upper midline abdomen and excision of sebaceous cyst mid chest     /77 (Site: Left Upper Arm, Position: Sitting, Cuff Size: Large Adult)   Pulse 95   Temp 97.9 °F (36.6 °C) (Oral)   Resp 16   Ht 1.549 m (5' 1\")   Wt 77.5 kg (170 lb 12.8 oz)   SpO2 96%   BMI 32.27 kg/m²     1. Have you been to the ER, urgent care clinic since your last visit?  Hospitalized since your last visit?no    2. Have you seen or consulted any other health care providers outside of the [facility] since your last visit?  Include any pap smears or colon screening. no

## 2025-02-20 NOTE — PROGRESS NOTES
Surgery Progress Note    2025    had concerns including Post-Op Check (2 weeks postop excision of sebaceous cyst upper midline abdomen and excision of sebaceous cyst mid chest).     Subjective:     Patient is a 64 y.o. female status post excision of cyst on abdominal wall.  Patient reports there was some drainage the first few days but otherwise no drainage in the past 2 days.  She denies any fevers or chills.  Reports just slight soreness in the area but improving with time      Past Medical History:   Diagnosis Date    Arthritis     RA    Atypical ductal hyperplasia of right breast     Autoimmune disease     RA    Hypercholesterolemia     on meds    Hyperlipidemia     Hypertension     on meds     Ill-defined disease     lumbosacral pain d/t degenerative change in lumbar spine    Osteoarthritis     Rheumatoid arthritis (HCC)     Type 2 diabetes mellitus (HCC)         Past Surgical History:   Procedure Laterality Date    BIOPSY OF BREAST, NEEDLE CORE Right     BREAST BIOPSY Right 2022    RIGHT BREAST EXCISIONAL BIOPSY WITH ULTRASOUND performed by Catina Morataya MD at [facility] AMBULATORY OR    BREAST SURGERY N/A 2025    . performed by Clementine Desouza MD at [facility] MAIN OR    CATARACT REMOVAL Bilateral      SECTION      EXCISION/BIOPSY N/A 2025    EXCISION OF SEBACEOUS CYST UPPER MIDLINE ABDOMEN, EXCISION OF SEBACEOUS CYST MID CHEST (MAC WITH LOCAL) performed by Clementine Desouza MD at [facility] MAIN OR    GI      COLONOSCOPY    HYSTERECTOMY (CERVIX STATUS UNKNOWN)  2007    [facility]    LENA STEREO BREAST BX W LOC DEVICE 1ST LESION RIGHT Right 2022    LENA STEROTACTIC LOC BREAST BIOPSY RIGHT 2022 [facility] RAD MAMMO    PELVIC LAPAROSCOPY  3/20/08    for pelvic pain and findings were negative    POLYPECTOMY            Objective:     Vitals:    25 1043   BP: 135/77   Pulse: 95   Resp: 16   Temp: 97.9 °F (36.6 °C)   SpO2: 96%     Body mass index is 32.27 kg/m².  Wt Readings from Last 3

## 2025-02-21 ENCOUNTER — HOSPITAL ENCOUNTER (EMERGENCY)
Facility: HOSPITAL | Age: 65
Discharge: HOME | End: 2025-02-21
Attending: STUDENT IN AN ORGANIZED HEALTH CARE EDUCATION/TRAINING PROGRAM
Payer: COMMERCIAL

## 2025-02-21 ENCOUNTER — APPOINTMENT (OUTPATIENT)
Facility: HOSPITAL | Age: 65
End: 2025-02-21
Payer: COMMERCIAL

## 2025-02-21 VITALS
WEIGHT: 166 LBS | TEMPERATURE: 97.7 F | OXYGEN SATURATION: 96 % | HEART RATE: 97 BPM | BODY MASS INDEX: 31.34 KG/M2 | SYSTOLIC BLOOD PRESSURE: 182 MMHG | RESPIRATION RATE: 18 BRPM | HEIGHT: 61 IN | DIASTOLIC BLOOD PRESSURE: 87 MMHG

## 2025-02-21 DIAGNOSIS — R10.10 PAIN OF UPPER ABDOMEN: Primary | ICD-10-CM

## 2025-02-21 LAB
ALBUMIN SERPL-MCNC: 4.1 G/DL (ref 3.5–5.2)
ALBUMIN/GLOB SERPL: 1.2 (ref 1.1–2.2)
ALP SERPL-CCNC: 75 U/L (ref 35–104)
ALT SERPL-CCNC: 21 U/L (ref 10–35)
ANION GAP SERPL CALC-SCNC: 11 MMOL/L (ref 2–12)
APPEARANCE UR: CLEAR
AST SERPL-CCNC: 30 U/L (ref 10–35)
BACTERIA URNS QL MICRO: NEGATIVE /HPF
BASOPHILS # BLD: 0.02 K/UL (ref 0–0.1)
BASOPHILS NFR BLD: 0.4 % (ref 0–1)
BILIRUB SERPL-MCNC: 0.4 MG/DL (ref 0.2–1)
BILIRUB UR QL: NEGATIVE
BUN SERPL-MCNC: 14 MG/DL (ref 8–23)
BUN/CREAT SERPL: ABNORMAL (ref 12–20)
CALCIUM SERPL-MCNC: 9.8 MG/DL (ref 8.8–10.2)
CHLORIDE SERPL-SCNC: 106 MMOL/L (ref 98–107)
CO2 SERPL-SCNC: 27 MMOL/L (ref 22–29)
COLOR UR: NORMAL
COMMENT:: NORMAL
CREAT SERPL-MCNC: <0.47 MG/DL (ref 0.5–0.9)
DIFFERENTIAL METHOD BLD: ABNORMAL
EOSINOPHIL # BLD: 0.23 K/UL (ref 0–0.4)
EOSINOPHIL NFR BLD: 4.6 % (ref 0–7)
EPITH CASTS URNS QL MICRO: NORMAL /LPF
ERYTHROCYTE [DISTWIDTH] IN BLOOD BY AUTOMATED COUNT: 13.5 % (ref 11.5–14.5)
GLOBULIN SER CALC-MCNC: 3.5 G/DL (ref 2–4)
GLUCOSE SERPL-MCNC: 72 MG/DL (ref 65–100)
GLUCOSE UR STRIP.AUTO-MCNC: NEGATIVE MG/DL
HCT VFR BLD AUTO: 33.9 % (ref 35–47)
HGB BLD-MCNC: 11.1 G/DL (ref 11.5–16)
HGB UR QL STRIP: NEGATIVE
IMM GRANULOCYTES # BLD AUTO: 0.02 K/UL (ref 0–0.04)
IMM GRANULOCYTES NFR BLD AUTO: 0.4 % (ref 0–0.5)
KETONES UR QL STRIP.AUTO: NEGATIVE MG/DL
LEUKOCYTE ESTERASE UR QL STRIP.AUTO: NEGATIVE
LIPASE SERPL-CCNC: 35 U/L (ref 13–60)
LYMPHOCYTES # BLD: 2.13 K/UL (ref 0.8–3.5)
LYMPHOCYTES NFR BLD: 42.7 % (ref 12–49)
MCH RBC QN AUTO: 31.1 PG (ref 26–34)
MCHC RBC AUTO-ENTMCNC: 32.7 G/DL (ref 30–36.5)
MCV RBC AUTO: 95 FL (ref 80–99)
MONOCYTES # BLD: 0.61 K/UL (ref 0–1)
MONOCYTES NFR BLD: 12.2 % (ref 5–13)
NEUTS SEG # BLD: 1.98 K/UL (ref 1.8–8)
NEUTS SEG NFR BLD: 39.7 % (ref 32–75)
NITRITE UR QL STRIP.AUTO: NEGATIVE
NRBC # BLD: 0 K/UL (ref 0–0.01)
NRBC BLD-RTO: 0 PER 100 WBC
PH UR STRIP: 6.5 (ref 5–8)
PLATELET # BLD AUTO: 243 K/UL (ref 150–400)
PMV BLD AUTO: 8.9 FL (ref 8.9–12.9)
POTASSIUM SERPL-SCNC: 3.2 MMOL/L (ref 3.5–5.1)
PROT SERPL-MCNC: 7.6 G/DL (ref 6.4–8.3)
PROT UR STRIP-MCNC: NEGATIVE MG/DL
RBC # BLD AUTO: 3.57 M/UL (ref 3.8–5.2)
RBC #/AREA URNS HPF: NORMAL /HPF
SODIUM SERPL-SCNC: 144 MMOL/L (ref 136–145)
SP GR UR REFRACTOMETRY: 1.01 (ref 1–1.03)
SPECIMEN HOLD: NORMAL
UROBILINOGEN UR QL STRIP.AUTO: 0.2 EU/DL (ref 0.2–1)
WBC # BLD AUTO: 5 K/UL (ref 3.6–11)
WBC URNS QL MICRO: NORMAL /HPF (ref 0–4)

## 2025-02-21 PROCEDURE — 6370000000 HC RX 637 (ALT 250 FOR IP)

## 2025-02-21 PROCEDURE — 36415 COLL VENOUS BLD VENIPUNCTURE: CPT

## 2025-02-21 PROCEDURE — 6360000002 HC RX W HCPCS

## 2025-02-21 PROCEDURE — 81001 URINALYSIS AUTO W/SCOPE: CPT

## 2025-02-21 PROCEDURE — 85025 COMPLETE CBC W/AUTO DIFF WBC: CPT

## 2025-02-21 PROCEDURE — 6360000004 HC RX CONTRAST MEDICATION

## 2025-02-21 PROCEDURE — 80053 COMPREHEN METABOLIC PANEL: CPT

## 2025-02-21 PROCEDURE — 83690 ASSAY OF LIPASE: CPT

## 2025-02-21 PROCEDURE — 74177 CT ABD & PELVIS W/CONTRAST: CPT

## 2025-02-21 PROCEDURE — 96374 THER/PROPH/DIAG INJ IV PUSH: CPT

## 2025-02-21 PROCEDURE — 99285 EMERGENCY DEPT VISIT HI MDM: CPT

## 2025-02-21 RX ORDER — ONDANSETRON 4 MG/1
4 TABLET, ORALLY DISINTEGRATING ORAL 3 TIMES DAILY PRN
Qty: 21 TABLET | Refills: 0 | Status: SHIPPED | OUTPATIENT
Start: 2025-02-21

## 2025-02-21 RX ORDER — POTASSIUM CHLORIDE 750 MG/1
40 TABLET, EXTENDED RELEASE ORAL ONCE
Status: COMPLETED | OUTPATIENT
Start: 2025-02-21 | End: 2025-02-21

## 2025-02-21 RX ORDER — KETOROLAC TROMETHAMINE 30 MG/ML
15 INJECTION, SOLUTION INTRAMUSCULAR; INTRAVENOUS ONCE
Status: COMPLETED | OUTPATIENT
Start: 2025-02-21 | End: 2025-02-21

## 2025-02-21 RX ORDER — IOPAMIDOL 755 MG/ML
100 INJECTION, SOLUTION INTRAVASCULAR
Status: COMPLETED | OUTPATIENT
Start: 2025-02-21 | End: 2025-02-21

## 2025-02-21 RX ORDER — DICYCLOMINE HYDROCHLORIDE 10 MG/1
10 CAPSULE ORAL
Qty: 120 CAPSULE | Refills: 0 | Status: SHIPPED | OUTPATIENT
Start: 2025-02-21

## 2025-02-21 RX ADMIN — POTASSIUM CHLORIDE 40 MEQ: 750 TABLET, FILM COATED, EXTENDED RELEASE ORAL at 19:38

## 2025-02-21 RX ADMIN — KETOROLAC TROMETHAMINE 15 MG: 30 INJECTION, SOLUTION INTRAMUSCULAR at 18:27

## 2025-02-21 RX ADMIN — IOPAMIDOL 100 ML: 755 INJECTION, SOLUTION INTRAVENOUS at 19:44

## 2025-02-21 ASSESSMENT — PAIN DESCRIPTION - PAIN TYPE: TYPE: ACUTE PAIN

## 2025-02-21 ASSESSMENT — LIFESTYLE VARIABLES
HOW MANY STANDARD DRINKS CONTAINING ALCOHOL DO YOU HAVE ON A TYPICAL DAY: PATIENT DOES NOT DRINK
HOW OFTEN DO YOU HAVE A DRINK CONTAINING ALCOHOL: NEVER

## 2025-02-21 ASSESSMENT — ENCOUNTER SYMPTOMS
NAUSEA: 0
ABDOMINAL PAIN: 1
VOMITING: 0
DIARRHEA: 0

## 2025-02-21 ASSESSMENT — PAIN DESCRIPTION - LOCATION
LOCATION: ABDOMEN
LOCATION: ABDOMEN

## 2025-02-21 ASSESSMENT — PAIN DESCRIPTION - DESCRIPTORS: DESCRIPTORS: CRAMPING

## 2025-02-21 ASSESSMENT — PAIN SCALES - GENERAL
PAINLEVEL_OUTOF10: 8
PAINLEVEL_OUTOF10: 7

## 2025-02-21 ASSESSMENT — PAIN - FUNCTIONAL ASSESSMENT
PAIN_FUNCTIONAL_ASSESSMENT: ACTIVITIES ARE NOT PREVENTED
PAIN_FUNCTIONAL_ASSESSMENT: 0-10

## 2025-02-21 NOTE — ED TRIAGE NOTES
Patient arrives to ED ambulatory w/o difficulty. No acute distress noted in triage. A&O x 4. Skin is warm, dry & intact on obs.     Pt c/o right sided abdominal pain x 1 month. Denies n/v/d.

## 2025-02-21 NOTE — ED PROVIDER NOTES
[facility] EMERGENCY DEPARTMENT  EMERGENCY DEPARTMENT ENCOUNTER      Pt Name: Claire Briones  MRN: [id number]  Birthdate 1960  Date of evaluation: 2/21/2025  Provider: CORNELIA Villanueva    CHIEF COMPLAINT       Chief Complaint   Patient presents with    Abdominal Pain         HISTORY OF PRESENT ILLNESS   (Location/Symptom, Timing/Onset, Context/Setting, Quality, Duration, Modifying Factors, Severity)  Note limiting factors.   Claire Briones is a 64 y.o. female with history of hypertension, diabetes, unstable angina who presents to the emergency department complaining of left upper quadrant abdominal pain that exacerbated today however reports having this pain for the past month.  She reports taking ibuprofen at home with little relief.  She denies any fevers, chills, nausea, vomiting, diarrhea, chest pain, shortness of breath, lower extremity swelling or pain.  She reports having a sebaceous cyst surgery on 2/11 with follow-up postop appointment yesterday where general surgeon stated routine postop healing.  He denies any history of kidney stones but states she has a history of gallstones.  She reports she is able to tolerate p.o. at this time.  Reports pain exacerbates with movement and eating up to an 8 out of 10.  [facility] to come to the ER.              Review of External Medical Records:     Nursing Notes were reviewed.    REVIEW OF SYSTEMS    (2-9 systems for level 4, 10 or more for level 5)     Review of Systems   Gastrointestinal:  Positive for abdominal pain. Negative for diarrhea, nausea and vomiting.       Except as noted above the remainder of the review of systems was reviewed and negative.       PAST MEDICAL HISTORY     Past Medical History:   Diagnosis Date    Arthritis     RA    Atypical ductal hyperplasia of right breast     Autoimmune disease     RA    Hypercholesterolemia     on meds    Hyperlipidemia     Hypertension     on meds     Ill-defined

## 2025-02-22 NOTE — DISCHARGE INSTRUCTIONS
As discussed today, CT of the abdomen was unremarkable.  Blood work was also unremarkable however while here potassium was 3.2 therefore be replenished you while you are in the ED.  It is possible that abdominal pain is due to gas and constipation therefore I am prescribing Bentyl for you to take 4 times a day for abdominal cramping.  Please take Zofran as needed for nausea and vomiting.  Please follow-up with primary care doctor if symptoms persist.

## 2025-03-10 RX ORDER — INSULIN GLARGINE 300 U/ML
INJECTION, SOLUTION SUBCUTANEOUS
Qty: 24 ML | Refills: 1 | Status: SHIPPED | OUTPATIENT
Start: 2025-03-10

## 2025-03-13 DIAGNOSIS — I10 PRIMARY HYPERTENSION: ICD-10-CM

## 2025-03-14 RX ORDER — LISINOPRIL 40 MG/1
40 TABLET ORAL DAILY
Qty: 90 TABLET | Refills: 1 | Status: SHIPPED | OUTPATIENT
Start: 2025-03-14

## 2025-03-19 ENCOUNTER — OFFICE VISIT (OUTPATIENT)
Age: 65
End: 2025-03-19

## 2025-03-19 VITALS
RESPIRATION RATE: 18 BRPM | HEART RATE: 92 BPM | DIASTOLIC BLOOD PRESSURE: 80 MMHG | HEIGHT: 61 IN | OXYGEN SATURATION: 98 % | WEIGHT: 173.2 LBS | SYSTOLIC BLOOD PRESSURE: 135 MMHG | TEMPERATURE: 97.9 F | BODY MASS INDEX: 32.7 KG/M2

## 2025-03-19 DIAGNOSIS — S71.101A OPEN THIGH WOUND, RIGHT, INITIAL ENCOUNTER: Primary | ICD-10-CM

## 2025-03-19 DIAGNOSIS — L02.415 ABSCESS OF RIGHT THIGH: ICD-10-CM

## 2025-03-19 RX ORDER — AMOXICILLIN AND CLAVULANATE POTASSIUM 500; 125 MG/1; MG/1
1 TABLET, FILM COATED ORAL 2 TIMES DAILY
COMMUNITY
Start: 2025-03-14

## 2025-03-19 ASSESSMENT — PATIENT HEALTH QUESTIONNAIRE - PHQ9
SUM OF ALL RESPONSES TO PHQ QUESTIONS 1-9: 0
2. FEELING DOWN, DEPRESSED OR HOPELESS: NOT AT ALL
SUM OF ALL RESPONSES TO PHQ QUESTIONS 1-9: 0
1. LITTLE INTEREST OR PLEASURE IN DOING THINGS: NOT AT ALL

## 2025-03-19 NOTE — PROGRESS NOTES
Surgery Progress Note    3/19/2025    had concerns including Cyst (Inner right thigh).     Subjective:     Patient is a 64 y.o. female who approximately a month ago I removed a sebaceous cyst from her chest.  Patient comes back stating that approximately 2 weeks ago she had a blister on her right inner thigh that grew very fast.  She reports it started draining on its own and [facility].  She was placed on antibiotics which she started on 3/14/2025.  She states that the antibiotics were recently changed due to what will grew out on the cultures although I do not have the culture information.  Patient was instructed to follow-up with her surgeon which is why she is here now.  She states is  in the area but it has continued to drain      Constitutional: No fever or chills  Neurologic: No headache  Eyes: No scleral icterus or irritated eyes  Nose: No nasal pain or drainage  Mouth: No oral lesions or sore throat  Cardiac: No palpations or chest pain  Pulmonary: No cough or shortness of breath  Gastrointestinal: No nausea, emesis, diarrhea, or constipation  Genitourinary: No dysuria  Musculoskeletal: No muscle or joint tenderness  Skin: No rashes or lesions  Psychiatric: No anxiety or depressed mood    Current Outpatient Medications   Medication Instructions    amLODIPine (NORVASC) 5 MG tablet DAILY    amoxicillin-clavulanate (AUGMENTIN) 500-125 MG per tablet 1 tablet, 2 times daily    aspirin 81 mg, Oral, DAILY    atorvastatin (LIPITOR) 20 MG tablet TAKE 1 TABLET DAILY IN THE EVENING    blood glucose monitor strips Test 3 times a day.  Dispense brand preferred by insurance.    carvedilol (COREG) 6.25 mg, Oral, 2 TIMES DAILY    dicyclomine (BENTYL) 10 mg, Oral, 4 TIMES DAILY BEFORE MEALS & NIGHTLY    ferrous sulfate (IRON 325) 325 (65 Fe) MG tablet 1 tablet, DAILY    glipiZIDE (GLUCOTROL) 10 mg, Oral, 2 TIMES DAILY    glucose monitoring kit Use as directed to check

## 2025-03-19 NOTE — PROGRESS NOTES
Identified pt with two pt identifiers (name and ). Reviewed chart in preparation for visit and have obtained necessary documentation.    Claire Briones is a 64 y.o. female  Chief Complaint   Patient presents with    Cyst     Inner right thigh     /80 (BP Site: Left Upper Arm, Patient Position: Sitting, BP Cuff Size: Large Adult)   Pulse 92   Temp 97.9 °F (36.6 °C) (Oral)   Resp 18   Ht 1.549 m (5' 1\")   Wt 78.6 kg (173 lb 3.2 oz)   SpO2 98%   BMI 32.73 kg/m²     1. Have you been to the ER, urgent care clinic since your last visit?  Hospitalized since your last visit?no    2. Have you seen or consulted any other health care providers outside of the [facility] since your last visit?  Include any pap smears or colon screening. no

## 2025-03-19 NOTE — PROGRESS NOTES
Procedures    Procedure Note - Wound Debridement  4:16 PM EDT   Procedure by CORNELIA Groves   4 cm healing abscess with central clearing of overlying fibrinous tissue to right thigh  was prepped with Betadine and draped in a sterile fashion.  The area was anesthetized via local infiltration of 18 mL Lidocaine 1%.  The fibrinous tissue was debrided using 11blade scalpal through the dermal layer of tissues. The wound was left open to close via secondary intention due to history of infection.  Sterile dressing applied.  Estimated blood loss: minimal  The procedure took 16-30 minutes, and patient tolerated well.

## 2025-06-03 DIAGNOSIS — E11.65 TYPE 2 DIABETES MELLITUS WITH HYPERGLYCEMIA, WITH LONG-TERM CURRENT USE OF INSULIN (HCC): ICD-10-CM

## 2025-06-03 DIAGNOSIS — Z79.4 TYPE 2 DIABETES MELLITUS WITH HYPERGLYCEMIA, WITH LONG-TERM CURRENT USE OF INSULIN (HCC): ICD-10-CM

## 2025-06-05 LAB
ALBUMIN/CREAT UR: 101 MG/G CREAT (ref 0–29)
BUN SERPL-MCNC: 14 MG/DL (ref 8–27)
BUN/CREAT SERPL: 39 (ref 12–28)
CALCIUM SERPL-MCNC: 9.6 MG/DL (ref 8.7–10.3)
CHLORIDE SERPL-SCNC: 108 MMOL/L (ref 96–106)
CO2 SERPL-SCNC: 20 MMOL/L (ref 20–29)
CREAT SERPL-MCNC: 0.36 MG/DL (ref 0.57–1)
CREAT UR-MCNC: 89 MG/DL
EGFRCR SERPLBLD CKD-EPI 2021: 113 ML/MIN/1.73
GLUCOSE SERPL-MCNC: 131 MG/DL (ref 70–99)
HBA1C MFR BLD: 7.4 % (ref 4.8–5.6)
MICROALBUMIN UR-MCNC: 89.6 UG/ML
POTASSIUM SERPL-SCNC: 4 MMOL/L (ref 3.5–5.2)
SODIUM SERPL-SCNC: 143 MMOL/L (ref 134–144)

## 2025-06-26 ENCOUNTER — OFFICE VISIT (OUTPATIENT)
Age: 65
End: 2025-06-26
Payer: COMMERCIAL

## 2025-06-26 VITALS — WEIGHT: 173 LBS | HEIGHT: 61 IN | BODY MASS INDEX: 32.66 KG/M2

## 2025-06-26 DIAGNOSIS — N60.12 FIBROCYSTIC BREAST CHANGES OF BOTH BREASTS: Primary | ICD-10-CM

## 2025-06-26 DIAGNOSIS — Z12.31 ENCOUNTER FOR SCREENING MAMMOGRAM FOR BREAST CANCER: ICD-10-CM

## 2025-06-26 DIAGNOSIS — Z91.89 AT HIGH RISK FOR BREAST CANCER: ICD-10-CM

## 2025-06-26 DIAGNOSIS — N60.11 FIBROCYSTIC BREAST CHANGES OF BOTH BREASTS: Primary | ICD-10-CM

## 2025-06-26 PROCEDURE — 99213 OFFICE O/P EST LOW 20 MIN: CPT | Performed by: NURSE PRACTITIONER

## 2025-06-26 RX ORDER — ADALIMUMAB-ADBM 40MG/0.4ML
KIT SUBCUTANEOUS
COMMUNITY
Start: 2025-06-24

## 2025-06-26 NOTE — PROGRESS NOTES
HISTORY OF PRESENT ILLNESS  Claire Briones is a 64 y.o. female     HPI   Established patient presents for follow-up as a high risk patient due to her personal history of ADH. Denies breast mass, skin changes, nipple discharge and pain.              Breast history -    Referring - Carolina Lewis    STbx - 22 -  ATYPICAL DUCTAL HYPERPLASIA   22 - RIGHT breast excisional biopsy - Dr. Morataya   1.  Right breast, excisional biopsy:   Focal ductal hyperplasia adjacent to previous biopsy site (see Comment).   Flat epithelial atypia.   Microcalcifications present within benign ducts.   2.  Right breast excisional  biopsy, anterior margin:   Flat epithelial atypia.   Biopsy site changes.   Microcalcifications present within benign ducts.   2025 - sebaceous cyst excised by general surgery              Family history -    Maternal aunt had cancer not sure what type.    Father had throat cancer.         OB History               Para        Term                AB        Living             SAB        IAB        Ectopic        Molar        Multiple        Live Births   2          Obstetric Comments   Menarche 16, LMP n/a, # of children 2, age of 1st delivery 1982, Hysterectomy/oophorectomy yes/yes, Breast bx yes/right/22, history of breast feeding no, BCP no, Hormone therapy no                   Past Surgical History:   Procedure Laterality Date    BIOPSY OF BREAST, NEEDLE CORE Right     BREAST BIOPSY Right 2022    RIGHT BREAST EXCISIONAL BIOPSY WITH ULTRASOUND performed by Catina Morataya MD at [facility] AMBULATORY OR    BREAST SURGERY N/A 2025    . performed by Clementine Desouza MD at [facility] MAIN OR    CATARACT REMOVAL Bilateral      SECTION      EXCISION/BIOPSY N/A 2025    EXCISION OF SEBACEOUS CYST UPPER MIDLINE ABDOMEN, EXCISION OF SEBACEOUS CYST MID CHEST (MAC WITH LOCAL) performed by Clementine Desouza MD at [facility] MAIN OR    GI      COLONOSCOPY    HYSTERECTOMY (CERVIX

## 2025-07-01 ENCOUNTER — OFFICE VISIT (OUTPATIENT)
Age: 65
End: 2025-07-01
Payer: COMMERCIAL

## 2025-07-01 VITALS
WEIGHT: 171 LBS | HEART RATE: 87 BPM | SYSTOLIC BLOOD PRESSURE: 169 MMHG | DIASTOLIC BLOOD PRESSURE: 83 MMHG | BODY MASS INDEX: 32.28 KG/M2 | HEIGHT: 61 IN

## 2025-07-01 DIAGNOSIS — Z79.4 TYPE 2 DIABETES MELLITUS WITH HYPERGLYCEMIA, WITH LONG-TERM CURRENT USE OF INSULIN (HCC): Primary | ICD-10-CM

## 2025-07-01 DIAGNOSIS — E11.65 TYPE 2 DIABETES MELLITUS WITH HYPERGLYCEMIA, WITH LONG-TERM CURRENT USE OF INSULIN (HCC): Primary | ICD-10-CM

## 2025-07-01 DIAGNOSIS — Z79.4 TYPE 2 DIABETES MELLITUS WITH DIABETIC MICROALBUMINURIA, WITH LONG-TERM CURRENT USE OF INSULIN (HCC): ICD-10-CM

## 2025-07-01 DIAGNOSIS — I10 PRIMARY HYPERTENSION: ICD-10-CM

## 2025-07-01 DIAGNOSIS — R80.9 TYPE 2 DIABETES MELLITUS WITH DIABETIC MICROALBUMINURIA, WITH LONG-TERM CURRENT USE OF INSULIN (HCC): ICD-10-CM

## 2025-07-01 DIAGNOSIS — E78.2 MIXED HYPERLIPIDEMIA: ICD-10-CM

## 2025-07-01 DIAGNOSIS — E11.29 TYPE 2 DIABETES MELLITUS WITH DIABETIC MICROALBUMINURIA, WITH LONG-TERM CURRENT USE OF INSULIN (HCC): ICD-10-CM

## 2025-07-01 PROCEDURE — 3051F HG A1C>EQUAL 7.0%<8.0%: CPT | Performed by: INTERNAL MEDICINE

## 2025-07-01 PROCEDURE — 3079F DIAST BP 80-89 MM HG: CPT | Performed by: INTERNAL MEDICINE

## 2025-07-01 PROCEDURE — G2211 COMPLEX E/M VISIT ADD ON: HCPCS | Performed by: INTERNAL MEDICINE

## 2025-07-01 PROCEDURE — 3077F SYST BP >= 140 MM HG: CPT | Performed by: INTERNAL MEDICINE

## 2025-07-01 PROCEDURE — 99214 OFFICE O/P EST MOD 30 MIN: CPT | Performed by: INTERNAL MEDICINE

## 2025-07-01 RX ORDER — SEMAGLUTIDE 2.68 MG/ML
INJECTION, SOLUTION SUBCUTANEOUS
Qty: 3 ML | Refills: 5 | Status: SHIPPED | OUTPATIENT
Start: 2025-07-01

## 2025-07-01 RX ORDER — HYDROCHLOROTHIAZIDE 12.5 MG/1
CAPSULE ORAL
Qty: 2 EACH | Refills: 5 | Status: SHIPPED | OUTPATIENT
Start: 2025-07-01

## 2025-07-01 RX ORDER — SEMAGLUTIDE 0.68 MG/ML
INJECTION, SOLUTION SUBCUTANEOUS
Qty: 3 ML | Refills: 0 | Status: SHIPPED | COMMUNITY
Start: 2025-07-01

## 2025-07-01 NOTE — PROGRESS NOTES
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]    Was a no-show for [facility] in past    A1c: current a1c is 7.4    DM Medications:    Toujeo max 70 AM  Glipizide 5mg 2 BID AC (not missing 2nd dose now)  Mounjaro 10mg once a week     Last Changes: :mounjaro increased    Sugar Checks: checks more than 4 times a day and uses Dexcom CGM -no longer covered - has to be on insulin3 x day     AM: reports:  130-150   PM: reports:  not checking    LOWs:  denies low sugars     DIET: is working on it, feels does well with diabetic diet - weight is stable     EXERCISE: is trying to add exercise -has gym membership (knee issues)    HTN: on ACE-I, on Ca-Blk, HTN followed by PCP     LIPIDS: on statin, lipids followed by PCP    RENAL: has normal renal function, has normal TARAS-r , in the past year, is on an ACE-I     EYES: eye exam in past year, has no retinopathy     DENTAL:  has seen dentist in past year     FEET: has no current issues, no numbness or tingling     HEART:  no chest pain, shortness of breath or claudication, has no cardiac history     ASA:  does not take aspirin or other blood thinner     SYMPTOMS: no polyuria, thirst or blurred vision     THYROID: no known thyroid issue    DIABETES HISTORY:    From initial visit at [facility]:  Diagnosed '07   Started off on metformin and glyburide   was on avandia but stopped b/c heart risks (no wt gain or swelling)   Insulin started 6m -1 yr ago   Was on januvia in past - she just stopped taking (b/c heard could cause cancer0   No starlix or prandin   Only 70/30 for insulin type   No GLP1   Working out in past did not help sugars      LABS/STUDIES:   7/30/21 a1c 8.9, TSH 1.5   10/24/20 chol 184/133/64/97, TARAS-r 28, a1c 9.8

## 2025-07-01 NOTE — PATIENT INSTRUCTIONS
Ozempic Sample:  Start 0.5mg once a week for 1-2 weeks  Then increase to 0.5mg once a week for 1-2 weeks    Can then take 2 x 0.5mg for 1.0mg dose or use 37 clicks with 2.0mg dose  Then go to 2.0mg dose    Ozempic doses in 2.0mg pen:  0.25mg - 9 clicks  0.5mg - 19 clicks  1.0mg - 37 clicks  2.0mg - 74 clicks

## 2025-07-12 DIAGNOSIS — Z79.4 TYPE 2 DIABETES MELLITUS WITH HYPERGLYCEMIA, WITH LONG-TERM CURRENT USE OF INSULIN (HCC): ICD-10-CM

## 2025-07-12 DIAGNOSIS — E11.65 TYPE 2 DIABETES MELLITUS WITH HYPERGLYCEMIA, WITH LONG-TERM CURRENT USE OF INSULIN (HCC): ICD-10-CM

## 2025-07-13 RX ORDER — BLOOD SUGAR DIAGNOSTIC
STRIP MISCELLANEOUS
Qty: 100 STRIP | Refills: 5 | Status: SHIPPED | OUTPATIENT
Start: 2025-07-13

## (undated) DEVICE — PROVE COVER: Brand: UNBRANDED

## (undated) DEVICE — DRAPE,REIN 53X77,STERILE: Brand: MEDLINE

## (undated) DEVICE — TOWEL,OR,DSP,ST,BLUE,STD,4/PK,20PK/CS: Brand: MEDLINE

## (undated) DEVICE — INSULATED BLADE ELECTRODE;CAUTION: FOR MANUFACTURING, PROCESSING, OR REPACKING.: Brand: EDGE

## (undated) DEVICE — PENCIL SMK EVAC ALL IN 1 DSGN ENH VISIBILITY IMPROVED AIR

## (undated) DEVICE — DERMABOND SKIN ADH 0.7ML -- DERMABOND ADVANCED 12/BX

## (undated) DEVICE — SYR 10ML LUER LOK 1/5ML GRAD --

## (undated) DEVICE — SOLUTION IRRIG 1000ML H2O PIC PLAS SHATTERPROOF CONTAINER

## (undated) DEVICE — SUTURE VCRL SZ 2-0 L27IN ABSRB UD L26MM SH 1/2 CIR J417H

## (undated) DEVICE — INSULATED BLADE ELECTRODE: Brand: EDGE

## (undated) DEVICE — SPONGE GZ W4XL4IN COT 12 PLY TYP VII WVN C FLD DSGN

## (undated) DEVICE — 3M™ TEGADERM™ I.V. SECUREMENT DRESSING, 9519HP, 2-3/8 IN X 2-3/8 IN (6 CM X 6 CM), 100/CT 4 CT/CASE: Brand: 3M™ TEGADERM™

## (undated) DEVICE — SUTURE VICRYL + SZ 4-0 L27IN ABSRB UD PS-2 3/8 CIR REV CUT VCP426H

## (undated) DEVICE — LIQUIBAND RAPID ADHESIVE 36/CS 0.8ML: Brand: MEDLINE

## (undated) DEVICE — SUTURE VICRYL + SZ 3-0 L27IN ABSRB UD L26MM SH 1/2 CIR VCP416H

## (undated) DEVICE — BRA SURG POST-OP LG 42IN --

## (undated) DEVICE — DRAPE,LAPAROTOMY,PED,STERILE: Brand: MEDLINE

## (undated) DEVICE — GARMENT,MEDLINE,DVT,INT,CALF,MED, GEN2: Brand: MEDLINE

## (undated) DEVICE — PLASTICS CHEST BREAST ASU: Brand: MEDLINE INDUSTRIES, INC.

## (undated) DEVICE — SOLUTION IRRIG 1000ML 0.9% SOD CHL USP POUR PLAS BTL

## (undated) DEVICE — GLOVE SURG SZ 6 THK91MIL LTX FREE SYN POLYISOPRENE ANTI

## (undated) DEVICE — PENCIL SMK EVAC L10FT DIA95MM TBNG NONSTICK W ADPT TO 22MM

## (undated) DEVICE — TRANSFER SET 3": Brand: MEDLINE INDUSTRIES, INC.

## (undated) DEVICE — CANISTER, RIGID, 3000CC: Brand: MEDLINE INDUSTRIES, INC.

## (undated) DEVICE — GLOVE SURG SZ 6 L12IN FNGR THK79MIL GRN LTX FREE

## (undated) DEVICE — BASIC GENERAL-SFMC: Brand: MEDLINE INDUSTRIES, INC.

## (undated) DEVICE — SUTURE MCRYL SZ 4-0 L27IN ABSRB UD L19MM PS-2 1/2 CIR PRIM Y426H

## (undated) DEVICE — NEEDLE HYPO 22GA L1.5IN BLK POLYPR HUB S STL REG BVL STR

## (undated) DEVICE — SOLUTION IV 500ML 0.9% SOD BOTTLE CHL LTWT DURABLE SHATTERPROOF